# Patient Record
Sex: MALE | Race: WHITE | NOT HISPANIC OR LATINO | Employment: OTHER | URBAN - METROPOLITAN AREA
[De-identification: names, ages, dates, MRNs, and addresses within clinical notes are randomized per-mention and may not be internally consistent; named-entity substitution may affect disease eponyms.]

---

## 2019-07-09 ENCOUNTER — HOSPITAL ENCOUNTER (OUTPATIENT)
Dept: LAB | Facility: MEDICAL CENTER | Age: 49
End: 2019-07-09
Attending: NURSE PRACTITIONER
Payer: MEDICARE

## 2019-07-09 ENCOUNTER — HOSPITAL ENCOUNTER (OUTPATIENT)
Dept: LAB | Facility: MEDICAL CENTER | Age: 49
End: 2019-07-09
Attending: INTERNAL MEDICINE
Payer: MEDICARE

## 2019-07-09 LAB
ALBUMIN SERPL BCP-MCNC: 3.7 G/DL (ref 3.2–4.9)
ALBUMIN SERPL BCP-MCNC: 3.8 G/DL (ref 3.2–4.9)
ALBUMIN/GLOB SERPL: 0.9 G/DL
ALBUMIN/GLOB SERPL: 1 G/DL
ALP SERPL-CCNC: 44 U/L (ref 30–99)
ALP SERPL-CCNC: 45 U/L (ref 30–99)
ALT SERPL-CCNC: 12 U/L (ref 2–50)
ALT SERPL-CCNC: 14 U/L (ref 2–50)
ANION GAP SERPL CALC-SCNC: 6 MMOL/L (ref 0–11.9)
ANION GAP SERPL CALC-SCNC: 6 MMOL/L (ref 0–11.9)
AST SERPL-CCNC: 19 U/L (ref 12–45)
AST SERPL-CCNC: 20 U/L (ref 12–45)
BASOPHILS # BLD AUTO: 1.1 % (ref 0–1.8)
BASOPHILS # BLD AUTO: 1.1 % (ref 0–1.8)
BASOPHILS # BLD: 0.07 K/UL (ref 0–0.12)
BASOPHILS # BLD: 0.07 K/UL (ref 0–0.12)
BILIRUB SERPL-MCNC: 0.7 MG/DL (ref 0.1–1.5)
BILIRUB SERPL-MCNC: 0.8 MG/DL (ref 0.1–1.5)
BUN SERPL-MCNC: 23 MG/DL (ref 8–22)
BUN SERPL-MCNC: 23 MG/DL (ref 8–22)
CALCIUM SERPL-MCNC: 9.8 MG/DL (ref 8.5–10.5)
CALCIUM SERPL-MCNC: 9.8 MG/DL (ref 8.5–10.5)
CHLORIDE SERPL-SCNC: 105 MMOL/L (ref 96–112)
CHLORIDE SERPL-SCNC: 105 MMOL/L (ref 96–112)
CHOLEST SERPL-MCNC: 179 MG/DL (ref 100–199)
CHOLEST SERPL-MCNC: 180 MG/DL (ref 100–199)
CO2 SERPL-SCNC: 28 MMOL/L (ref 20–33)
CO2 SERPL-SCNC: 29 MMOL/L (ref 20–33)
CREAT SERPL-MCNC: 1.14 MG/DL (ref 0.5–1.4)
CREAT SERPL-MCNC: 1.19 MG/DL (ref 0.5–1.4)
EOSINOPHIL # BLD AUTO: 0.04 K/UL (ref 0–0.51)
EOSINOPHIL # BLD AUTO: 0.05 K/UL (ref 0–0.51)
EOSINOPHIL NFR BLD: 0.6 % (ref 0–6.9)
EOSINOPHIL NFR BLD: 0.8 % (ref 0–6.9)
ERYTHROCYTE [DISTWIDTH] IN BLOOD BY AUTOMATED COUNT: 51 FL (ref 35.9–50)
ERYTHROCYTE [DISTWIDTH] IN BLOOD BY AUTOMATED COUNT: 52.4 FL (ref 35.9–50)
GLOBULIN SER CALC-MCNC: 3.9 G/DL (ref 1.9–3.5)
GLOBULIN SER CALC-MCNC: 3.9 G/DL (ref 1.9–3.5)
GLUCOSE SERPL-MCNC: 103 MG/DL (ref 65–99)
GLUCOSE SERPL-MCNC: 104 MG/DL (ref 65–99)
HCT VFR BLD AUTO: 49.3 % (ref 42–52)
HCT VFR BLD AUTO: 49.3 % (ref 42–52)
HDLC SERPL-MCNC: 45 MG/DL
HDLC SERPL-MCNC: 46 MG/DL
HGB BLD-MCNC: 15.9 G/DL (ref 14–18)
HGB BLD-MCNC: 16.2 G/DL (ref 14–18)
IMM GRANULOCYTES # BLD AUTO: 0.04 K/UL (ref 0–0.11)
IMM GRANULOCYTES # BLD AUTO: 0.05 K/UL (ref 0–0.11)
IMM GRANULOCYTES NFR BLD AUTO: 0.6 % (ref 0–0.9)
IMM GRANULOCYTES NFR BLD AUTO: 0.8 % (ref 0–0.9)
LDLC SERPL CALC-MCNC: 114 MG/DL
LDLC SERPL CALC-MCNC: 114 MG/DL
LYMPHOCYTES # BLD AUTO: 1.7 K/UL (ref 1–4.8)
LYMPHOCYTES # BLD AUTO: 1.78 K/UL (ref 1–4.8)
LYMPHOCYTES NFR BLD: 27.5 % (ref 22–41)
LYMPHOCYTES NFR BLD: 27.7 % (ref 22–41)
MCH RBC QN AUTO: 32 PG (ref 27–33)
MCH RBC QN AUTO: 32.5 PG (ref 27–33)
MCHC RBC AUTO-ENTMCNC: 32.3 G/DL (ref 33.7–35.3)
MCHC RBC AUTO-ENTMCNC: 32.9 G/DL (ref 33.7–35.3)
MCV RBC AUTO: 99 FL (ref 81.4–97.8)
MCV RBC AUTO: 99.2 FL (ref 81.4–97.8)
MONOCYTES # BLD AUTO: 0.46 K/UL (ref 0–0.85)
MONOCYTES # BLD AUTO: 0.5 K/UL (ref 0–0.85)
MONOCYTES NFR BLD AUTO: 7.4 % (ref 0–13.4)
MONOCYTES NFR BLD AUTO: 7.8 % (ref 0–13.4)
NEUTROPHILS # BLD AUTO: 3.88 K/UL (ref 1.82–7.42)
NEUTROPHILS # BLD AUTO: 3.98 K/UL (ref 1.82–7.42)
NEUTROPHILS NFR BLD: 61.8 % (ref 44–72)
NEUTROPHILS NFR BLD: 62.8 % (ref 44–72)
NRBC # BLD AUTO: 0 K/UL
NRBC # BLD AUTO: 0 K/UL
NRBC BLD-RTO: 0 /100 WBC
NRBC BLD-RTO: 0 /100 WBC
PLATELET # BLD AUTO: 223 K/UL (ref 164–446)
PLATELET # BLD AUTO: 227 K/UL (ref 164–446)
PMV BLD AUTO: 9.5 FL (ref 9–12.9)
PMV BLD AUTO: 9.7 FL (ref 9–12.9)
POTASSIUM SERPL-SCNC: 4.2 MMOL/L (ref 3.6–5.5)
POTASSIUM SERPL-SCNC: 4.2 MMOL/L (ref 3.6–5.5)
PROT SERPL-MCNC: 7.6 G/DL (ref 6–8.2)
PROT SERPL-MCNC: 7.7 G/DL (ref 6–8.2)
RBC # BLD AUTO: 4.97 M/UL (ref 4.7–6.1)
RBC # BLD AUTO: 4.98 M/UL (ref 4.7–6.1)
SODIUM SERPL-SCNC: 139 MMOL/L (ref 135–145)
SODIUM SERPL-SCNC: 140 MMOL/L (ref 135–145)
T3FREE SERPL-MCNC: 2.73 PG/ML (ref 2.4–4.2)
T4 FREE SERPL-MCNC: 0.77 NG/DL (ref 0.53–1.43)
TRIGL SERPL-MCNC: 101 MG/DL (ref 0–149)
TRIGL SERPL-MCNC: 102 MG/DL (ref 0–149)
TSH SERPL DL<=0.005 MIU/L-ACNC: 0.06 UIU/ML (ref 0.38–5.33)
TSH SERPL DL<=0.005 MIU/L-ACNC: 0.07 UIU/ML (ref 0.38–5.33)
WBC # BLD AUTO: 6.2 K/UL (ref 4.8–10.8)
WBC # BLD AUTO: 6.4 K/UL (ref 4.8–10.8)

## 2019-07-09 PROCEDURE — 80053 COMPREHEN METABOLIC PANEL: CPT

## 2019-07-09 PROCEDURE — 84443 ASSAY THYROID STIM HORMONE: CPT

## 2019-07-09 PROCEDURE — 80061 LIPID PANEL: CPT | Mod: 91

## 2019-07-09 PROCEDURE — 84481 FREE ASSAY (FT-3): CPT

## 2019-07-09 PROCEDURE — 86800 THYROGLOBULIN ANTIBODY: CPT

## 2019-07-09 PROCEDURE — 84439 ASSAY OF FREE THYROXINE: CPT

## 2019-07-09 PROCEDURE — 80061 LIPID PANEL: CPT

## 2019-07-09 PROCEDURE — 85025 COMPLETE CBC W/AUTO DIFF WBC: CPT | Mod: 91

## 2019-07-09 PROCEDURE — 36415 COLL VENOUS BLD VENIPUNCTURE: CPT

## 2019-07-09 PROCEDURE — 84443 ASSAY THYROID STIM HORMONE: CPT | Mod: 91

## 2019-07-09 PROCEDURE — 80053 COMPREHEN METABOLIC PANEL: CPT | Mod: 91

## 2019-07-09 PROCEDURE — 85025 COMPLETE CBC W/AUTO DIFF WBC: CPT

## 2019-07-11 LAB — THYROGLOB AB SERPL-ACNC: 1.7 IU/ML (ref 0–4)

## 2019-08-24 ENCOUNTER — APPOINTMENT (OUTPATIENT)
Dept: RADIOLOGY | Facility: MEDICAL CENTER | Age: 49
DRG: 177 | End: 2019-08-24
Attending: EMERGENCY MEDICINE
Payer: MEDICARE

## 2019-08-24 ENCOUNTER — HOSPITAL ENCOUNTER (INPATIENT)
Facility: MEDICAL CENTER | Age: 49
LOS: 4 days | DRG: 177 | End: 2019-08-29
Attending: EMERGENCY MEDICINE | Admitting: HOSPITALIST
Payer: MEDICARE

## 2019-08-24 DIAGNOSIS — R06.00 DYSPNEA, UNSPECIFIED TYPE: ICD-10-CM

## 2019-08-24 DIAGNOSIS — J69.0 ASPIRATION PNEUMONIA DUE TO REGURGITATED FOOD, UNSPECIFIED LATERALITY, UNSPECIFIED PART OF LUNG (HCC): ICD-10-CM

## 2019-08-24 DIAGNOSIS — R47.02 DYSPHASIA: ICD-10-CM

## 2019-08-24 DIAGNOSIS — Q90.9 DOWN'S SYNDROME: ICD-10-CM

## 2019-08-24 PROCEDURE — 700102 HCHG RX REV CODE 250 W/ 637 OVERRIDE(OP)

## 2019-08-24 PROCEDURE — 83880 ASSAY OF NATRIURETIC PEPTIDE: CPT

## 2019-08-24 PROCEDURE — 85025 COMPLETE CBC W/AUTO DIFF WBC: CPT

## 2019-08-24 PROCEDURE — 80053 COMPREHEN METABOLIC PANEL: CPT

## 2019-08-24 PROCEDURE — 84484 ASSAY OF TROPONIN QUANT: CPT

## 2019-08-24 PROCEDURE — 93005 ELECTROCARDIOGRAM TRACING: CPT | Performed by: EMERGENCY MEDICINE

## 2019-08-24 PROCEDURE — 304561 HCHG STAT O2

## 2019-08-24 PROCEDURE — 99285 EMERGENCY DEPT VISIT HI MDM: CPT

## 2019-08-24 PROCEDURE — A9270 NON-COVERED ITEM OR SERVICE: HCPCS

## 2019-08-24 PROCEDURE — 93005 ELECTROCARDIOGRAM TRACING: CPT

## 2019-08-24 RX ADMIN — LIDOCAINE HYDROCHLORIDE 30 ML: 20 SOLUTION OROPHARYNGEAL at 23:08

## 2019-08-24 RX ADMIN — Medication 30 ML: at 23:08

## 2019-08-24 SDOH — HEALTH STABILITY: MENTAL HEALTH: HOW OFTEN DO YOU HAVE A DRINK CONTAINING ALCOHOL?: NEVER

## 2019-08-25 ENCOUNTER — APPOINTMENT (OUTPATIENT)
Dept: RADIOLOGY | Facility: MEDICAL CENTER | Age: 49
DRG: 177 | End: 2019-08-25
Attending: INTERNAL MEDICINE
Payer: MEDICARE

## 2019-08-25 PROBLEM — Q90.9 DOWN'S SYNDROME: Status: ACTIVE | Noted: 2019-08-25

## 2019-08-25 PROBLEM — E66.3 OVERWEIGHT (BMI 25.0-29.9): Status: ACTIVE | Noted: 2019-08-25

## 2019-08-25 PROBLEM — J96.20 ACUTE ON CHRONIC RESPIRATORY FAILURE (HCC): Status: ACTIVE | Noted: 2019-08-25

## 2019-08-25 PROBLEM — J69.0 ASPIRATION PNEUMONIA (HCC): Status: ACTIVE | Noted: 2019-08-25

## 2019-08-25 LAB
ALBUMIN SERPL BCP-MCNC: 4 G/DL (ref 3.2–4.9)
ALBUMIN/GLOB SERPL: 1.1 G/DL
ALP SERPL-CCNC: 51 U/L (ref 30–99)
ALT SERPL-CCNC: 23 U/L (ref 2–50)
ANION GAP SERPL CALC-SCNC: 10 MMOL/L (ref 0–11.9)
AST SERPL-CCNC: 24 U/L (ref 12–45)
BASOPHILS # BLD AUTO: 0.7 % (ref 0–1.8)
BASOPHILS # BLD: 0.08 K/UL (ref 0–0.12)
BILIRUB SERPL-MCNC: 0.4 MG/DL (ref 0.1–1.5)
BUN SERPL-MCNC: 26 MG/DL (ref 8–22)
CALCIUM SERPL-MCNC: 9.3 MG/DL (ref 8.5–10.5)
CHLORIDE SERPL-SCNC: 103 MMOL/L (ref 96–112)
CO2 SERPL-SCNC: 25 MMOL/L (ref 20–33)
CREAT SERPL-MCNC: 1.3 MG/DL (ref 0.5–1.4)
EKG IMPRESSION: NORMAL
EOSINOPHIL # BLD AUTO: 0.02 K/UL (ref 0–0.51)
EOSINOPHIL NFR BLD: 0.2 % (ref 0–6.9)
ERYTHROCYTE [DISTWIDTH] IN BLOOD BY AUTOMATED COUNT: 50.5 FL (ref 35.9–50)
GLOBULIN SER CALC-MCNC: 3.8 G/DL (ref 1.9–3.5)
GLUCOSE SERPL-MCNC: 162 MG/DL (ref 65–99)
HCT VFR BLD AUTO: 52.3 % (ref 42–52)
HGB BLD-MCNC: 17.6 G/DL (ref 14–18)
IMM GRANULOCYTES # BLD AUTO: 0.08 K/UL (ref 0–0.11)
IMM GRANULOCYTES NFR BLD AUTO: 0.7 % (ref 0–0.9)
LYMPHOCYTES # BLD AUTO: 1.18 K/UL (ref 1–4.8)
LYMPHOCYTES NFR BLD: 10.6 % (ref 22–41)
MCH RBC QN AUTO: 33.5 PG (ref 27–33)
MCHC RBC AUTO-ENTMCNC: 33.7 G/DL (ref 33.7–35.3)
MCV RBC AUTO: 99.6 FL (ref 81.4–97.8)
MONOCYTES # BLD AUTO: 0.46 K/UL (ref 0–0.85)
MONOCYTES NFR BLD AUTO: 4.1 % (ref 0–13.4)
NEUTROPHILS # BLD AUTO: 9.35 K/UL (ref 1.82–7.42)
NEUTROPHILS NFR BLD: 83.7 % (ref 44–72)
NRBC # BLD AUTO: 0 K/UL
NRBC BLD-RTO: 0 /100 WBC
NT-PROBNP SERPL IA-MCNC: 220 PG/ML (ref 0–125)
PLATELET # BLD AUTO: 199 K/UL (ref 164–446)
PMV BLD AUTO: 9.3 FL (ref 9–12.9)
POTASSIUM SERPL-SCNC: 4.3 MMOL/L (ref 3.6–5.5)
PROT SERPL-MCNC: 7.8 G/DL (ref 6–8.2)
RBC # BLD AUTO: 5.25 M/UL (ref 4.7–6.1)
SODIUM SERPL-SCNC: 138 MMOL/L (ref 135–145)
TROPONIN T SERPL-MCNC: 21 NG/L (ref 6–19)
WBC # BLD AUTO: 11.2 K/UL (ref 4.8–10.8)

## 2019-08-25 PROCEDURE — 96365 THER/PROPH/DIAG IV INF INIT: CPT

## 2019-08-25 PROCEDURE — 74018 RADEX ABDOMEN 1 VIEW: CPT

## 2019-08-25 PROCEDURE — 99223 1ST HOSP IP/OBS HIGH 75: CPT | Mod: 25 | Performed by: HOSPITALIST

## 2019-08-25 PROCEDURE — 700111 HCHG RX REV CODE 636 W/ 250 OVERRIDE (IP): Performed by: HOSPITALIST

## 2019-08-25 PROCEDURE — 36415 COLL VENOUS BLD VENIPUNCTURE: CPT

## 2019-08-25 PROCEDURE — 302136 NUTRITION PUMP: Performed by: INTERNAL MEDICINE

## 2019-08-25 PROCEDURE — 92610 EVALUATE SWALLOWING FUNCTION: CPT

## 2019-08-25 PROCEDURE — 700111 HCHG RX REV CODE 636 W/ 250 OVERRIDE (IP): Performed by: EMERGENCY MEDICINE

## 2019-08-25 PROCEDURE — 770020 HCHG ROOM/CARE - TELE (206)

## 2019-08-25 PROCEDURE — 99497 ADVNCD CARE PLAN 30 MIN: CPT | Performed by: INTERNAL MEDICINE

## 2019-08-25 PROCEDURE — 71045 X-RAY EXAM CHEST 1 VIEW: CPT

## 2019-08-25 PROCEDURE — 3E0G76Z INTRODUCTION OF NUTRITIONAL SUBSTANCE INTO UPPER GI, VIA NATURAL OR ARTIFICIAL OPENING: ICD-10-PCS | Performed by: INTERNAL MEDICINE

## 2019-08-25 PROCEDURE — 700105 HCHG RX REV CODE 258: Performed by: HOSPITALIST

## 2019-08-25 PROCEDURE — 700105 HCHG RX REV CODE 258: Performed by: EMERGENCY MEDICINE

## 2019-08-25 PROCEDURE — 87040 BLOOD CULTURE FOR BACTERIA: CPT

## 2019-08-25 RX ORDER — AMOXICILLIN 250 MG
2 CAPSULE ORAL 2 TIMES DAILY
Status: DISCONTINUED | OUTPATIENT
Start: 2019-08-25 | End: 2019-08-29 | Stop reason: HOSPADM

## 2019-08-25 RX ORDER — LEVOTHYROXINE SODIUM 0.15 MG/1
150 TABLET ORAL
COMMUNITY

## 2019-08-25 RX ORDER — ONDANSETRON 4 MG/1
4 TABLET, ORALLY DISINTEGRATING ORAL EVERY 4 HOURS PRN
Status: DISCONTINUED | OUTPATIENT
Start: 2019-08-25 | End: 2019-08-29 | Stop reason: HOSPADM

## 2019-08-25 RX ORDER — ACETAMINOPHEN 325 MG/1
650 TABLET ORAL EVERY 6 HOURS PRN
Status: DISCONTINUED | OUTPATIENT
Start: 2019-08-25 | End: 2019-08-29 | Stop reason: HOSPADM

## 2019-08-25 RX ORDER — PROMETHAZINE HYDROCHLORIDE 25 MG/1
12.5-25 SUPPOSITORY RECTAL EVERY 4 HOURS PRN
Status: DISCONTINUED | OUTPATIENT
Start: 2019-08-25 | End: 2019-08-29 | Stop reason: HOSPADM

## 2019-08-25 RX ORDER — AMOXICILLIN 250 MG
2 CAPSULE ORAL 2 TIMES DAILY
Status: DISCONTINUED | OUTPATIENT
Start: 2019-08-25 | End: 2019-08-25

## 2019-08-25 RX ORDER — ACETAMINOPHEN 325 MG/1
650 TABLET ORAL EVERY 6 HOURS PRN
Status: DISCONTINUED | OUTPATIENT
Start: 2019-08-25 | End: 2019-08-25

## 2019-08-25 RX ORDER — SODIUM CHLORIDE, SODIUM LACTATE, POTASSIUM CHLORIDE, CALCIUM CHLORIDE 600; 310; 30; 20 MG/100ML; MG/100ML; MG/100ML; MG/100ML
INJECTION, SOLUTION INTRAVENOUS CONTINUOUS
Status: DISCONTINUED | OUTPATIENT
Start: 2019-08-25 | End: 2019-08-28

## 2019-08-25 RX ORDER — POLYETHYLENE GLYCOL 3350 17 G/17G
1 POWDER, FOR SOLUTION ORAL
Status: DISCONTINUED | OUTPATIENT
Start: 2019-08-25 | End: 2019-08-29 | Stop reason: HOSPADM

## 2019-08-25 RX ORDER — ONDANSETRON 4 MG/1
4 TABLET, ORALLY DISINTEGRATING ORAL EVERY 4 HOURS PRN
Status: DISCONTINUED | OUTPATIENT
Start: 2019-08-25 | End: 2019-08-25

## 2019-08-25 RX ORDER — AZITHROMYCIN 500 MG/1
500 INJECTION, POWDER, LYOPHILIZED, FOR SOLUTION INTRAVENOUS ONCE
Status: COMPLETED | OUTPATIENT
Start: 2019-08-25 | End: 2019-08-25

## 2019-08-25 RX ORDER — ONDANSETRON 2 MG/ML
4 INJECTION INTRAMUSCULAR; INTRAVENOUS EVERY 4 HOURS PRN
Status: DISCONTINUED | OUTPATIENT
Start: 2019-08-25 | End: 2019-08-29 | Stop reason: HOSPADM

## 2019-08-25 RX ORDER — POLYETHYLENE GLYCOL 3350 17 G/17G
1 POWDER, FOR SOLUTION ORAL
Status: DISCONTINUED | OUTPATIENT
Start: 2019-08-25 | End: 2019-08-25

## 2019-08-25 RX ORDER — CLONIDINE HYDROCHLORIDE 0.1 MG/1
0.1 TABLET ORAL EVERY 6 HOURS PRN
Status: DISCONTINUED | OUTPATIENT
Start: 2019-08-25 | End: 2019-08-29 | Stop reason: HOSPADM

## 2019-08-25 RX ORDER — PROCHLORPERAZINE EDISYLATE 5 MG/ML
5-10 INJECTION INTRAMUSCULAR; INTRAVENOUS EVERY 4 HOURS PRN
Status: DISCONTINUED | OUTPATIENT
Start: 2019-08-25 | End: 2019-08-29 | Stop reason: HOSPADM

## 2019-08-25 RX ORDER — SERTRALINE HYDROCHLORIDE 100 MG/1
100 TABLET, FILM COATED ORAL EVERY MORNING
COMMUNITY

## 2019-08-25 RX ORDER — PROMETHAZINE HYDROCHLORIDE 25 MG/1
12.5-25 TABLET ORAL EVERY 4 HOURS PRN
Status: DISCONTINUED | OUTPATIENT
Start: 2019-08-25 | End: 2019-08-25

## 2019-08-25 RX ORDER — PROMETHAZINE HYDROCHLORIDE 25 MG/1
12.5-25 TABLET ORAL EVERY 4 HOURS PRN
Status: DISCONTINUED | OUTPATIENT
Start: 2019-08-25 | End: 2019-08-29 | Stop reason: HOSPADM

## 2019-08-25 RX ORDER — CLONIDINE HYDROCHLORIDE 0.1 MG/1
0.1 TABLET ORAL EVERY 6 HOURS PRN
Status: DISCONTINUED | OUTPATIENT
Start: 2019-08-25 | End: 2019-08-25

## 2019-08-25 RX ORDER — BISACODYL 10 MG
10 SUPPOSITORY, RECTAL RECTAL
Status: DISCONTINUED | OUTPATIENT
Start: 2019-08-25 | End: 2019-08-29 | Stop reason: HOSPADM

## 2019-08-25 RX ORDER — BISACODYL 10 MG
10 SUPPOSITORY, RECTAL RECTAL
Status: DISCONTINUED | OUTPATIENT
Start: 2019-08-25 | End: 2019-08-25

## 2019-08-25 RX ADMIN — ENOXAPARIN SODIUM 40 MG: 100 INJECTION SUBCUTANEOUS at 08:58

## 2019-08-25 RX ADMIN — SODIUM CHLORIDE, POTASSIUM CHLORIDE, SODIUM LACTATE AND CALCIUM CHLORIDE 1000 ML: 600; 310; 30; 20 INJECTION, SOLUTION INTRAVENOUS at 05:49

## 2019-08-25 RX ADMIN — AZITHROMYCIN MONOHYDRATE 500 MG: 500 INJECTION, POWDER, LYOPHILIZED, FOR SOLUTION INTRAVENOUS at 04:18

## 2019-08-25 RX ADMIN — AMPICILLIN AND SULBACTAM 3 G: 2; 1 INJECTION, POWDER, FOR SOLUTION INTRAVENOUS at 16:12

## 2019-08-25 RX ADMIN — CEFTRIAXONE SODIUM 1 G: 1 INJECTION, POWDER, FOR SOLUTION INTRAMUSCULAR; INTRAVENOUS at 03:05

## 2019-08-25 RX ADMIN — AMPICILLIN AND SULBACTAM 3 G: 2; 1 INJECTION, POWDER, FOR SOLUTION INTRAVENOUS at 08:59

## 2019-08-25 RX ADMIN — AMPICILLIN AND SULBACTAM 3 G: 2; 1 INJECTION, POWDER, FOR SOLUTION INTRAVENOUS at 21:00

## 2019-08-25 ASSESSMENT — LIFESTYLE VARIABLES
AVERAGE NUMBER OF DAYS PER WEEK YOU HAVE A DRINK CONTAINING ALCOHOL: 0
HOW MANY TIMES IN THE PAST YEAR HAVE YOU HAD 5 OR MORE DRINKS IN A DAY: 0
ON A TYPICAL DAY WHEN YOU DRINK ALCOHOL HOW MANY DRINKS DO YOU HAVE: 0
TOTAL SCORE: 0
TOTAL SCORE: 0
DOES PATIENT WANT TO STOP DRINKING: NO
CONSUMPTION TOTAL: NEGATIVE
EVER_SMOKED: NEVER
HAVE YOU EVER FELT YOU SHOULD CUT DOWN ON YOUR DRINKING: NO
TOTAL SCORE: 0
HAVE PEOPLE ANNOYED YOU BY CRITICIZING YOUR DRINKING: NO
ALCOHOL_USE: NO
EVER FELT BAD OR GUILTY ABOUT YOUR DRINKING: NO
EVER HAD A DRINK FIRST THING IN THE MORNING TO STEADY YOUR NERVES TO GET RID OF A HANGOVER: NO

## 2019-08-25 ASSESSMENT — COGNITIVE AND FUNCTIONAL STATUS - GENERAL
TOILETING: A LITTLE
TURNING FROM BACK TO SIDE WHILE IN FLAT BAD: A LITTLE
MOBILITY SCORE: 17
STANDING UP FROM CHAIR USING ARMS: A LITTLE
DRESSING REGULAR LOWER BODY CLOTHING: A LOT
DRESSING REGULAR UPPER BODY CLOTHING: A LOT
EATING MEALS: A LOT
MOVING FROM LYING ON BACK TO SITTING ON SIDE OF FLAT BED: A LITTLE
CLIMB 3 TO 5 STEPS WITH RAILING: A LOT
MOVING TO AND FROM BED TO CHAIR: A LITTLE
SUGGESTED CMS G CODE MODIFIER MOBILITY: CK
SUGGESTED CMS G CODE MODIFIER DAILY ACTIVITY: CL
PERSONAL GROOMING: A LOT
WALKING IN HOSPITAL ROOM: A LITTLE
HELP NEEDED FOR BATHING: A LOT
DAILY ACTIVITIY SCORE: 13

## 2019-08-25 ASSESSMENT — PATIENT HEALTH QUESTIONNAIRE - PHQ9
SUM OF ALL RESPONSES TO PHQ9 QUESTIONS 1 AND 2: 0
1. LITTLE INTEREST OR PLEASURE IN DOING THINGS: NOT AT ALL
1. LITTLE INTEREST OR PLEASURE IN DOING THINGS: NOT AT ALL
SUM OF ALL RESPONSES TO PHQ9 QUESTIONS 1 AND 2: 0
2. FEELING DOWN, DEPRESSED, IRRITABLE, OR HOPELESS: NOT AT ALL
2. FEELING DOWN, DEPRESSED, IRRITABLE, OR HOPELESS: NOT AT ALL

## 2019-08-25 ASSESSMENT — COPD QUESTIONNAIRES
DURING THE PAST 4 WEEKS HOW MUCH DID YOU FEEL SHORT OF BREATH: SOME OF THE TIME
DO YOU EVER COUGH UP ANY MUCUS OR PHLEGM?: NO/ONLY WITH OCCASIONAL COLDS OR INFECTIONS
HAVE YOU SMOKED AT LEAST 100 CIGARETTES IN YOUR ENTIRE LIFE: NO/DON'T KNOW
COPD SCREENING SCORE: 3
IN THE PAST 12 MONTHS DO YOU DO LESS THAN YOU USED TO BECAUSE OF YOUR BREATHING PROBLEMS: AGREE

## 2019-08-25 NOTE — H&P
Hospital Medicine History & Physical Note    Date of Service  8/25/2019    Primary Care Physician  Arian Haney M.D.    Consultants  None    Code Status  Full code     Chief Complaint  Shortness of breath, cough    History of Presenting Illness  49 y.o. Male with Down syndrome, with nonverbal state, but with no other significant medical problems was in his usual state of health until the evening prior to admission.  He apparently was attempting to eat, when he swallowed something incorrectly, and was unable to further swallow fluids or other food without coughing fits.  This did improve somewhat, however he was then unable to swallow his bedtime snack of pudding.  Additionally, he was noted to be somewhat febrile, so he was brought to the emergency department for further evaluation.  He cannot provide his own history given the Down syndrome and averbal state.    Review of Systems  Review of Systems   Unable to perform ROS: Patient nonverbal       Past Medical History   has no past medical history on file.    Surgical History   has no past surgical history on file.     Family History  family history includes Lung Disease in his mother.     Social History   reports that he has never smoked. He has never used smokeless tobacco. He reports that he does not drink alcohol or use drugs.    Allergies  No Known Allergies    Medications  None       Physical Exam  Temp:  [36.4 °C (97.5 °F)-37.1 °C (98.8 °F)] 37.1 °C (98.8 °F)  Pulse:  [] 104  Resp:  [17-20] 17  BP: (103-125)/(76-86) 123/77  SpO2:  [90 %-94 %] 94 %    Physical Exam   Constitutional: He appears well-developed and well-nourished. No distress.   HENT:   Head: Normocephalic and atraumatic.   Eyes: Pupils are equal, round, and reactive to light. Conjunctivae are normal.   Neck: Normal range of motion. Neck supple. No tracheal deviation present. No thyromegaly present.   Cardiovascular: Normal rate, regular rhythm and normal heart sounds. Exam reveals no  gallop and no friction rub.   No murmur heard.  Pulmonary/Chest: He is in respiratory distress. He has no wheezes. He has rales.   Abdominal: Soft. Bowel sounds are normal. He exhibits no distension and no mass. There is no tenderness. There is no rebound and no guarding.   Musculoskeletal: Normal range of motion. He exhibits no edema.   Lymphadenopathy:     He has no cervical adenopathy.   Neurological: He is alert. No cranial nerve deficit.   Skin: Skin is warm and dry. He is not diaphoretic.   Psychiatric: He has a normal mood and affect.   Nursing note and vitals reviewed.      Laboratory:  Recent Labs     08/24/19  2350   WBC 11.2*   RBC 5.25   HEMOGLOBIN 17.6   HEMATOCRIT 52.3*   MCV 99.6*   MCH 33.5*   MCHC 33.7   RDW 50.5*   PLATELETCT 199   MPV 9.3     Recent Labs     08/24/19  2350   SODIUM 138   POTASSIUM 4.3   CHLORIDE 103   CO2 25   GLUCOSE 162*   BUN 26*   CREATININE 1.30   CALCIUM 9.3     Recent Labs     08/24/19  2350   ALTSGPT 23   ASTSGOT 24   ALKPHOSPHAT 51   TBILIRUBIN 0.4   GLUCOSE 162*         Recent Labs     08/24/19  2350   NTPROBNP 220*         Recent Labs     08/24/19  2350   TROPONINT 21*       Urinalysis:    No results found     Imaging:  DX-CHEST-PORTABLE (1 VIEW)   Final Result      Bilateral opacities as noted above, consistent with pulmonary edema.            Assessment/Plan:  I anticipate this patient will require at least two midnights for appropriate medical management, necessitating inpatient admission.    * Aspiration pneumonia (HCC)  Assessment & Plan  Concern for the same, with aspiration event on evening prior to admission.  Plan for IV antibiotic therapy, monitor cultures, and Speech/Swallow evaluation.      Overweight (BMI 25.0-29.9)  Assessment & Plan  Body mass index is 25.87 kg/m².      Down's syndrome  Assessment & Plan  Non-verbal at baseline.  Will benefit from sister being at bedside if this can be accommodated.      Acute on chronic respiratory failure  (HCC)  Assessment & Plan  Typically on 3-4L oxygen at home at baseline.  Now requiring face mask.  Titrate down as tolerated.          VTE prophylaxis: SCD, lovenox

## 2019-08-25 NOTE — ED PROVIDER NOTES
ED Provider Note    CHIEF COMPLAINT  Chief Complaint   Patient presents with   • Difficulty Breathing   • Difficulty Swallowing       HPI  Arian Ramsey is a 49 y.o. male who presents with difficulty with swallowing.  Family states the patient was eating dinner this evening he started having some difficulty with swallowing and was throwing up.  Subsequently the patient's ox requirements did increase.  The patient is nonverbal due to mental retardation.  The patient has not had any recent fevers.  No further history could be obtained to the patient's nonverbal state.    REVIEW OF SYSTEMS  See HPI for further details.  Unobtainable    PAST MEDICAL HISTORY  No past medical history on file.    FAMILY HISTORY  [unfilled]    SOCIAL HISTORY  Social History     Socioeconomic History   • Marital status: Single     Spouse name: Not on file   • Number of children: Not on file   • Years of education: Not on file   • Highest education level: Not on file   Occupational History   • Not on file   Social Needs   • Financial resource strain: Not on file   • Food insecurity:     Worry: Not on file     Inability: Not on file   • Transportation needs:     Medical: Not on file     Non-medical: Not on file   Tobacco Use   • Smoking status: Never Smoker   • Smokeless tobacco: Never Used   Substance and Sexual Activity   • Alcohol use: Never     Frequency: Never   • Drug use: Never   • Sexual activity: Not on file   Lifestyle   • Physical activity:     Days per week: Not on file     Minutes per session: Not on file   • Stress: Not on file   Relationships   • Social connections:     Talks on phone: Not on file     Gets together: Not on file     Attends L[employer] service: Not on file     Active member of club or organization: Not on file     Attends meetings of clubs or organizations: Not on file     Relationship status: Not on file   • Intimate partner violence:     Fear of current or ex partner: Not on file     Emotionally abused:  "Not on file     Physically abused: Not on file     Forced sexual activity: Not on file   Other Topics Concern   • Not on file   Social History Narrative   • Not on file       SURGICAL HISTORY  No past surgical history on file.    CURRENT MEDICATIONS  Home Medications     Reviewed by Roma Puentes R.N. (Registered Nurse) on 08/24/19 at 2255  Med List Status: Partial   Medication Last Dose Status        Patient Narinder Taking any Medications                       ALLERGIES  No Known Allergies    PHYSICAL EXAM  VITAL SIGNS: /76   Pulse (!) 104   Temp 36.4 °C (97.5 °F) (Temporal)   Resp 20   Ht 1.499 m (4' 11\")   Wt 58.1 kg (128 lb 1.4 oz)   SpO2 91%   BMI 25.87 kg/m²       Constitutional: No obvious distress  HENT: Normocephalic, Atraumatic, Bilateral external ears normal, Oropharynx moist, No oral exudates, Nose normal.   Eyes: PERRLA, EOMI, Conjunctiva normal, No discharge.   Neck: Normal range of motion, No tenderness, Supple, No stridor.   Lymphatic: No lymphadenopathy noted.   Cardiovascular: Tachycardic heart rate, Normal rhythm, No murmurs, No rubs, No gallops.   Thorax & Lungs: Symmetrically diminished throughout, diffuse rhonchi, no rales  Abdomen: Bowel sounds normal, Soft, No tenderness, No masses, No pulsatile masses.   Skin: Warm, Dry, No erythema, No rash.   Back: No tenderness, No CVA tenderness.   Extremities: Intact distal pulses, No edema, No tenderness, No cyanosis, No clubbing.   Neurologic: At his baseline according to family  Psychiatric: Affect normal, Judgment normal, Mood normal.       RADIOLOGY/PROCEDURES  DX-CHEST-PORTABLE (1 VIEW)   Final Result      Bilateral opacities as noted above, consistent with pulmonary edema.        Differential diagnosis includes aspiration, pneumonia, heart failure, or viral pneumonitis    COURSE & MEDICAL DECISION MAKING  Pertinent Labs & Imaging studies reviewed. (See chart for details)  This a 49-year-old male who presents the emerge " department after a possible aspiration.  On arrival administered a GI cocktail initially tolerated GI cocktail however shortly thereafter he did start having a lot of coughing.  Therefore chest x-ray was ordered.  It does show some possible bilateral opacities consistent with pulmonary edema but clinically does not appear to be in heart failure.  I am concerned the patient may have had some aspiration he has had some continued coughing spells.  At the time of admission I did attempt an oral challenge with water and the patient did tolerate it with no acute coughing nor vomiting.  Is ox requirements have increased from his baseline.  The patient will therefore be admitted for possible aspiration pneumonitis.  The patient will receive Rocephin and azithromycin.  I have ordered a BNP to make sure he does not have any heart failure.  The patient be admitted to the hospitalist in stable condition.    FINAL IMPRESSION  1.  Dyspnea    Disposition  The patient will be admitted in stable condition         Electronically signed by: Thom Raymond, 8/24/2019 11:11 PM    Results for orders placed or performed during the hospital encounter of 08/24/19   CBC WITH DIFFERENTIAL   Result Value Ref Range    WBC 11.2 (H) 4.8 - 10.8 K/uL    RBC 5.25 4.70 - 6.10 M/uL    Hemoglobin 17.6 14.0 - 18.0 g/dL    Hematocrit 52.3 (H) 42.0 - 52.0 %    MCV 99.6 (H) 81.4 - 97.8 fL    MCH 33.5 (H) 27.0 - 33.0 pg    MCHC 33.7 33.7 - 35.3 g/dL    RDW 50.5 (H) 35.9 - 50.0 fL    Platelet Count 199 164 - 446 K/uL    MPV 9.3 9.0 - 12.9 fL    Neutrophils-Polys 83.70 (H) 44.00 - 72.00 %    Lymphocytes 10.60 (L) 22.00 - 41.00 %    Monocytes 4.10 0.00 - 13.40 %    Eosinophils 0.20 0.00 - 6.90 %    Basophils 0.70 0.00 - 1.80 %    Immature Granulocytes 0.70 0.00 - 0.90 %    Nucleated RBC 0.00 /100 WBC    Neutrophils (Absolute) 9.35 (H) 1.82 - 7.42 K/uL    Lymphs (Absolute) 1.18 1.00 - 4.80 K/uL    Monos (Absolute) 0.46 0.00 - 0.85 K/uL    Eos (Absolute) 0.02  0.00 - 0.51 K/uL    Baso (Absolute) 0.08 0.00 - 0.12 K/uL    Immature Granulocytes (abs) 0.08 0.00 - 0.11 K/uL    NRBC (Absolute) 0.00 K/uL   COMP METABOLIC PANEL   Result Value Ref Range    Sodium 138 135 - 145 mmol/L    Potassium 4.3 3.6 - 5.5 mmol/L    Chloride 103 96 - 112 mmol/L    Co2 25 20 - 33 mmol/L    Anion Gap 10.0 0.0 - 11.9    Glucose 162 (H) 65 - 99 mg/dL    Bun 26 (H) 8 - 22 mg/dL    Creatinine 1.30 0.50 - 1.40 mg/dL    Calcium 9.3 8.5 - 10.5 mg/dL    AST(SGOT) 24 12 - 45 U/L    ALT(SGPT) 23 2 - 50 U/L    Alkaline Phosphatase 51 30 - 99 U/L    Total Bilirubin 0.4 0.1 - 1.5 mg/dL    Albumin 4.0 3.2 - 4.9 g/dL    Total Protein 7.8 6.0 - 8.2 g/dL    Globulin 3.8 (H) 1.9 - 3.5 g/dL    A-G Ratio 1.1 g/dL   ESTIMATED GFR   Result Value Ref Range    GFR If African American >60 >60 mL/min/1.73 m 2    GFR If Non African American 58 (A) >60 mL/min/1.73 m 2   EKG   Result Value Ref Range    Report       [facility] Emergency Dept.    Test Date:  2019  Pt Name:    ILA RICH            Department: ER  MRN:        [id number]                      Room:  Gender:     Male                         Technician: [id number]  :        1970                   Requested By:ER TRIAGE PROTOCOL  Order #:    [id number]                    Reading MD: KUMAR LINN MD    Measurements  Intervals                                Axis  Rate:       111                          P:          69  NC:         184                          QRS:        266  QRSD:       108                          T:          68  QT:         344  QTc:        468    Interpretive Statements  Twelve-lead EKG shows a sinus tachycardia with a ventricular rate of 111, no  ST  segment elevation or depression, normal QRS complex, T wave inverted in V1  and  laterally in aVL  Electronically Signed On 2019 1:23:44 PDT by KUMAR LINN MD

## 2019-08-25 NOTE — ASSESSMENT & PLAN NOTE
8/26 aspiration event on evening prior to admission.    -Blood cultures pending  Continues Unasyn  Oxygen supplementation as needed  Ongoing leukocytosis  Positive procalcitonin of 2.3  8/27 blood culture from 8/25 negative for 48 hours  Respiratory status stable on 6 L oxygen mask  White blood cells went down to 9.7  8/28.  Repeat chest x-ray unchanged, on 6 L.  Need to wean down oxygen to 4 L based

## 2019-08-25 NOTE — ED NOTES
Pt now coughing and gagging but not actually vomiting at this time. Will notify provider and will continue to monitor.

## 2019-08-25 NOTE — PROGRESS NOTES
Shift report done at bedside with night RN. Pt resting comfortably with no signs of distress. Family at bedside. Bed alarm active, bed in lowest position, and instructions on call light given. Will continue to monitor patient and update plan of care.

## 2019-08-25 NOTE — ED TRIAGE NOTES
"Arian Trinidad [place]  49 y.o. male  Chief Complaint   Patient presents with   • Difficulty Breathing   • Difficulty Swallowing     Pt ambulated to triage with steady gait for above complaint.     Pt is non-verbal at baseline. Brother and sister report pt has been unable to swallow anything this evening, and continues to cough and make \"gagging noises.\"   Pt wears 4L O2 at home, currently on 6L sating 93%.     Charge RN notified of patient.     EKG ordered.     Pt back to lobby. Educated to inform staff of any concerns or changes.     Blood Pressure: 103/81, Pulse: (!) 130, Respiration: 18, Temperature: 36.4 °C (97.5 °F), Height: 149.9 cm (4' 11\"), Weight: 58.1 kg (128 lb 1.4 oz), Pulse Oximetry: 91 %, O2 (LPM): 5    "

## 2019-08-25 NOTE — ED NOTES
Pt given GI cocktail and tolerated well, ERP witnessed. ERP gave verbal orders to hold IV and blood tests at this time. Will continue to monitor.

## 2019-08-25 NOTE — ED NOTES
Pt resting in bed with even and unlabored breaths. No distress noted. Family at bedside. Will continue to monitor.

## 2019-08-25 NOTE — ED NOTES
Pt resting in bed in no apparent distress with even and unlabored breaths. Pt and sister at bedside updated regarding plan of care including status waiting for bed assignment in hospital, sister demonstrated understanding. Also called Tele 7 to let RN know that pt is ready for pickup.

## 2019-08-25 NOTE — ED NOTES
Pt resting in bed with even and unlabored breaths. No apparent distress noted and family is still at bedside. Pt appears to be breathing through his mouth so he was placed on an O2 face mask and is tolerating it well. Will continue to monitor pt while awaiting further orders.

## 2019-08-25 NOTE — ASSESSMENT & PLAN NOTE
Typically on 3-4L oxygen at home at baseline.  Now requiring face mask.    Titrate down as tolerated.  Otherwise do home O2 evaluation for new oxygen requirement

## 2019-08-25 NOTE — PROGRESS NOTES
49-year-old male with Down syndrome presents status post cough while eating.  He has been admitted for aspiration pneumonia with concerns for dysphasia.  He has been started on IV antibiotics.  To be seen by speech therapy.    He appears comfortable on oxygen supplementation.  We will continue current management.    Addendum: pt requiring 10L oxygen supplementation, improved sob  Appears comfortable, taper as tolerated.    Pt non-verbal, following some commands.  D/w father and sister at bedside.  Discussed POC and code status, wishes to remain full code, refusing cortrak placement, palliative care consult.    Seen by SLP, to reeducate, at this point family wishes to have reevaluation and would consider feeding despite aspiration risk.    Appreciate palliative care assistance.  RN at bedside.    .I discussed advance care planning with the patient's family for at least 26 minutes, including diagnosis, prognosis, plan of care, risks and benefits of any therapies that could be offered, as well as alternatives including palliation and hospice, as appropriate. Patient to remain full code. BILL CODE 01168.

## 2019-08-25 NOTE — THERAPY
"  Speech Language Therapy Clinical Swallow Evaluation completed.  Functional Status: Pt was seen for a clinical bedside swallow evaluation this date. Patient on 6L 02 via mask,admit for aspiration PNA. Patient alert and able to follow simple directives. Patient consumed PO trials of ice, thins, nectars via tsp. Patient demonstrated oral bolus holding, tongue pumping, tongue thrusting noted with decreased and delayed laryngeal elevation and hyolaryngeal excursion noted upon palpation. Immediate wet/gurgly sounds and notable increased WOB noted after all PO trials. Delayed throat clearing s/p trials of thins. Patient is at high risk for ongoing aspiration and increased 02 requirements at this time. Patients father at bedside and education was provided and discussed POC. At this time recommend NPO and consider alternative source of nutrition/hydration via Cortrak, pre feeding trials with SLP. Consider an MBS prior to full PO advancement to further assess oropharyngeal swallow function and r/o silent aspiration. Patient's father agreeable to POC.  Recommendations - Diet: Diet / Liquid Recommendation: NPO, Pre-Feeding Trials with SLP Only                          Strategies: to be assessed                           Medication Administration: Medication Administration : Via Gastric Tube  Plan of Care: Will benefit from Speech Therapy 5 times per week  Post-Acute Therapy: Discharge to a transitional care facility for continued skilled therapy services.    See \"Rehab Therapy-Acute\" Patient Summary Report for complete documentation.   "

## 2019-08-25 NOTE — ED NOTES
Pt has Down Syndrome and is nonverbal, his brother and sister (who take care of him) are at bedside speaking to ERP.

## 2019-08-25 NOTE — ASSESSMENT & PLAN NOTE
Non-verbal at baseline.  Will benefit from sister being at bedside if this can be accommodated.      8/26 pulling at cortrak, restraints prn    No history of dysphagia prior  Will need continued speech therapy

## 2019-08-25 NOTE — ED NOTES
Pt resting in bed with family at bedside. No distress noted at this time and breaths are even and and unlabored. Will continue to monitor.

## 2019-08-25 NOTE — ED NOTES
Bedside report given to KATTY Tineo. Pt awake and alert, in no apparent distress at time of transfer. Pt being taken upstairs with RN and tele tech via [employer] on cardiac monitor and O2. Pt's belongings and paperwork sent upstairs with pt, sister, and staff.

## 2019-08-25 NOTE — PROGRESS NOTES
Patient O2 sat in the 70s. Increased O2 to 10L from 6L. Patient now in the low 90s. Respiratory called for evaluation.

## 2019-08-25 NOTE — DIETARY
"Nutrition Support Assessment:  Day 0 of admit.  Arian Ramsey is a 49 y.o. male with admitting DX of aspiration pneumonia     Current problem list:  1. Acute on chronic respiratory failure  2. Aspiration pneumonia  3. Down's syndrome  4. Overweight    RD met with pt and family member at bedside. States pt tolerates all food, only had swallowing issues the evening before admittance. Relays that pt does not have front teeth, mainly just back teeth. States UBW of pt is 136 lbs, though unsure when last weighed this. No questions or concerns at this time.      Assessment:  Estimated Nutritional Needs based on:   Height: 149.9 cm (4' 11.02\")  Weight: 58.1 kg (128 lb 1.4 oz) via Stand up scale   Weight to Use in Calculations: 58.1 kg (128 lb 1.4 oz)  Ideal Body Weight: 47.2 kg (104 lb)  Body mass index is 25.86 kg/m²., BMI classification: Overweight     Calculation/Equation: MSJ x 1.2 =1529 kcal  Total Calories / day: 1529 - 1728  (Calories / k - 30)  Total Grams Protein / day: 64 - 70 (Grams Protein / k.1 - 1.2)     Evaluation:   1. Noted per H&P, pt in usual state of health until evening prior to admission. Noted pt swallowed something incorrectly and was unable to further swallow liquids or other foods without coughing fits.   2. Indication for nutrition support: per SLP noted today, recommending NPO with consideration for source of nutrition via cortrak, prefeeding trials with SLP. Noted consider MBS prior to full PO advancement.   3. Cortrak placement pending  4. No wt information per computer hx. Family member relays potential 8 lb loss based on UBW of 136 lbs, though unsure of time frame. Pt appears adequately nourished per RD visual observation.   5. Labs: Glucose 162, BUN 26, No A1C on file   6. Meds: lovenox, senokot, continuous lactated ringers infusion   7. Last BM: , small/brown/loose  8. Standard formula appropriate at this time.      Malnutrition Risk: Does not appear to present with " malnutrition at this time. To reevaluate as indicated.      Recommendations/Plan:  1. Recommend start Fibersource HN @ 25 ml/hr to advance per protocol tp goal rate of 55 mL/hr to provide 1584 kcal, 71 g protein, and 1069 mL free water.   2. Fluids per MD  3. Monitor weight  4.  Safest PO diet per SLP as medically feasible     RD following

## 2019-08-26 ENCOUNTER — APPOINTMENT (OUTPATIENT)
Dept: RADIOLOGY | Facility: MEDICAL CENTER | Age: 49
DRG: 177 | End: 2019-08-26
Attending: INTERNAL MEDICINE
Payer: MEDICARE

## 2019-08-26 PROBLEM — E43 SEVERE PROTEIN-CALORIE MALNUTRITION (HCC): Status: ACTIVE | Noted: 2019-08-26

## 2019-08-26 PROBLEM — R47.02 DYSPHASIA: Status: ACTIVE | Noted: 2019-08-26

## 2019-08-26 LAB
ANION GAP SERPL CALC-SCNC: 6 MMOL/L (ref 0–11.9)
BASOPHILS # BLD AUTO: 0.4 % (ref 0–1.8)
BASOPHILS # BLD: 0.06 K/UL (ref 0–0.12)
BUN SERPL-MCNC: 19 MG/DL (ref 8–22)
CALCIUM SERPL-MCNC: 8.9 MG/DL (ref 8.5–10.5)
CHLORIDE SERPL-SCNC: 105 MMOL/L (ref 96–112)
CO2 SERPL-SCNC: 28 MMOL/L (ref 20–33)
CREAT SERPL-MCNC: 1.08 MG/DL (ref 0.5–1.4)
CRP SERPL HS-MCNC: 18.54 MG/DL (ref 0–0.75)
EOSINOPHIL # BLD AUTO: 0.01 K/UL (ref 0–0.51)
EOSINOPHIL NFR BLD: 0.1 % (ref 0–6.9)
ERYTHROCYTE [DISTWIDTH] IN BLOOD BY AUTOMATED COUNT: 53.9 FL (ref 35.9–50)
GLUCOSE SERPL-MCNC: 121 MG/DL (ref 65–99)
HCT VFR BLD AUTO: 46.9 % (ref 42–52)
HGB BLD-MCNC: 15.1 G/DL (ref 14–18)
IMM GRANULOCYTES # BLD AUTO: 0.08 K/UL (ref 0–0.11)
IMM GRANULOCYTES NFR BLD AUTO: 0.5 % (ref 0–0.9)
LYMPHOCYTES # BLD AUTO: 1.29 K/UL (ref 1–4.8)
LYMPHOCYTES NFR BLD: 8.3 % (ref 22–41)
MCH RBC QN AUTO: 32.8 PG (ref 27–33)
MCHC RBC AUTO-ENTMCNC: 32.2 G/DL (ref 33.7–35.3)
MCV RBC AUTO: 102 FL (ref 81.4–97.8)
MONOCYTES # BLD AUTO: 0.64 K/UL (ref 0–0.85)
MONOCYTES NFR BLD AUTO: 4.1 % (ref 0–13.4)
NEUTROPHILS # BLD AUTO: 13.55 K/UL (ref 1.82–7.42)
NEUTROPHILS NFR BLD: 86.6 % (ref 44–72)
NRBC # BLD AUTO: 0 K/UL
NRBC BLD-RTO: 0 /100 WBC
PLATELET # BLD AUTO: 160 K/UL (ref 164–446)
PMV BLD AUTO: 9.8 FL (ref 9–12.9)
POTASSIUM SERPL-SCNC: 4 MMOL/L (ref 3.6–5.5)
PREALB SERPL-MCNC: 17 MG/DL (ref 18–38)
PROCALCITONIN SERPL-MCNC: 2.33 NG/ML
RBC # BLD AUTO: 4.6 M/UL (ref 4.7–6.1)
SODIUM SERPL-SCNC: 139 MMOL/L (ref 135–145)
WBC # BLD AUTO: 15.6 K/UL (ref 4.8–10.8)

## 2019-08-26 PROCEDURE — 770020 HCHG ROOM/CARE - TELE (206)

## 2019-08-26 PROCEDURE — 85025 COMPLETE CBC W/AUTO DIFF WBC: CPT

## 2019-08-26 PROCEDURE — 700105 HCHG RX REV CODE 258: Performed by: HOSPITALIST

## 2019-08-26 PROCEDURE — 99233 SBSQ HOSP IP/OBS HIGH 50: CPT | Performed by: INTERNAL MEDICINE

## 2019-08-26 PROCEDURE — 36415 COLL VENOUS BLD VENIPUNCTURE: CPT

## 2019-08-26 PROCEDURE — 84145 PROCALCITONIN (PCT): CPT

## 2019-08-26 PROCEDURE — 80048 BASIC METABOLIC PNL TOTAL CA: CPT

## 2019-08-26 PROCEDURE — 700102 HCHG RX REV CODE 250 W/ 637 OVERRIDE(OP): Performed by: INTERNAL MEDICINE

## 2019-08-26 PROCEDURE — A9270 NON-COVERED ITEM OR SERVICE: HCPCS | Performed by: INTERNAL MEDICINE

## 2019-08-26 PROCEDURE — 86140 C-REACTIVE PROTEIN: CPT

## 2019-08-26 PROCEDURE — 700111 HCHG RX REV CODE 636 W/ 250 OVERRIDE (IP): Performed by: HOSPITALIST

## 2019-08-26 PROCEDURE — 84134 ASSAY OF PREALBUMIN: CPT

## 2019-08-26 RX ADMIN — SODIUM CHLORIDE, POTASSIUM CHLORIDE, SODIUM LACTATE AND CALCIUM CHLORIDE: 600; 310; 30; 20 INJECTION, SOLUTION INTRAVENOUS at 02:07

## 2019-08-26 RX ADMIN — AMPICILLIN AND SULBACTAM 3 G: 2; 1 INJECTION, POWDER, FOR SOLUTION INTRAVENOUS at 21:50

## 2019-08-26 RX ADMIN — ACETAMINOPHEN 650 MG: 325 TABLET, FILM COATED ORAL at 06:50

## 2019-08-26 RX ADMIN — AMPICILLIN AND SULBACTAM 3 G: 2; 1 INJECTION, POWDER, FOR SOLUTION INTRAVENOUS at 04:59

## 2019-08-26 RX ADMIN — ENOXAPARIN SODIUM 40 MG: 100 INJECTION SUBCUTANEOUS at 05:04

## 2019-08-26 RX ADMIN — AMPICILLIN AND SULBACTAM 3 G: 2; 1 INJECTION, POWDER, FOR SOLUTION INTRAVENOUS at 09:02

## 2019-08-26 RX ADMIN — SODIUM CHLORIDE, POTASSIUM CHLORIDE, SODIUM LACTATE AND CALCIUM CHLORIDE: 600; 310; 30; 20 INJECTION, SOLUTION INTRAVENOUS at 18:09

## 2019-08-26 RX ADMIN — AMPICILLIN AND SULBACTAM 3 G: 2; 1 INJECTION, POWDER, FOR SOLUTION INTRAVENOUS at 15:49

## 2019-08-26 RX ADMIN — ACETAMINOPHEN 650 MG: 325 TABLET, FILM COATED ORAL at 15:49

## 2019-08-26 ASSESSMENT — PATIENT HEALTH QUESTIONNAIRE - PHQ9
SUM OF ALL RESPONSES TO PHQ9 QUESTIONS 1 AND 2: 0
1. LITTLE INTEREST OR PLEASURE IN DOING THINGS: NOT AT ALL
2. FEELING DOWN, DEPRESSED, IRRITABLE, OR HOPELESS: NOT AT ALL

## 2019-08-26 NOTE — RESPIRATORY CARE
COPD EDUCATION by COPD CLINICAL EDUCATOR  8/26/2019 at 7:20 AM by Keisha Lund     Patient reviewed by COPD education team. Patient does not have a history or diagnosis of COPD and is a non-smoker, therefore does not qualify for the COPD program.

## 2019-08-26 NOTE — PROGRESS NOTES
Cortrak Placement    Tube Team verified patient name and medical record number prior to tube placement.  Cortrak tube (43 inches, 10 Scottish) placed at 70 cm in right nare.  Per Cortrak picture, tube appears to be in the stomach.  Nursing Instructions: Awaiting KUB to confirm placement before use for medications or feeding. Once placement confirmed, flush tube with 30 ml of water, and then remove and save stylet, in patient medication drawer.

## 2019-08-26 NOTE — ASSESSMENT & PLAN NOTE
Seen by speech therapy  Requiring core tract for tube feeding  8/28 started on dysphagia diet.  We will keep core track until tomorrow to ensure adequate oral intake

## 2019-08-26 NOTE — PROGRESS NOTES
Cortrak Placement    Tube Team verified patient name and medical record number prior to tube placement.  Cortrak tube (43 inches, 10 Wallisian) placed at 70 cm in right nare.  Per Cortrak picture, tube appears to be in the stomach.    Nursing Instructions: Awaiting KUB to confirm placement before use for medications or feeding. Once placement confirmed, flush tube with 30 ml of water, and then remove and save stylet, in patient medication drawer.

## 2019-08-26 NOTE — PROGRESS NOTES
Hospital Medicine Daily Progress Note    Date of Service  8/26/2019    Chief Complaint  49 y.o. male admitted 8/24/2019 with history of nonverbal Down syndrome presents with dysphasia and aspiration pneumonia.  He is now requiring core track for tube feeds.  He is currently on IV antibiotics.    Hospital Course    49 y.o. male admitted 8/24/2019 with history of nonverbal Down syndrome presents with dysphasia and aspiration pneumonia.  He is now requiring core track for tube feeds.  He is currently on IV antibiotics.      Interval Problem Update  Appears comfortable  cortrak replaced  Father at bedside, poc reviewed  Family wants restraints to avoid pulling at cortrak    Consultants/Specialty  none    Code Status  Full    Disposition  TBD    Review of Systems  Review of Systems   Unable to perform ROS: Mental acuity        Physical Exam  Temp:  [36.6 °C (97.9 °F)-38 °C (100.4 °F)] 37.1 °C (98.8 °F)  Pulse:  [74-94] 94  Resp:  [16-20] 18  BP: ()/(56-70) 111/69  SpO2:  [92 %-96 %] 96 %    Physical Exam   Constitutional: He is oriented to person, place, and time. He appears well-nourished. No distress.   HENT:   Head: Normocephalic and atraumatic.   Nose: Nose normal.   cortrak in place   Eyes: Pupils are equal, round, and reactive to light. EOM are normal. Right eye exhibits no discharge. Left eye exhibits no discharge. No scleral icterus.   Neck: Neck supple. No JVD present. No thyromegaly present.   Cardiovascular: Normal rate and intact distal pulses.   No murmur heard.  Pulmonary/Chest: Effort normal and breath sounds normal. No respiratory distress. He has no wheezes.   Abdominal: Soft. Bowel sounds are normal. He exhibits no distension. There is no tenderness.   Musculoskeletal: He exhibits no edema or tenderness.   Neurological: He is alert and oriented to person, place, and time. No cranial nerve deficit. Coordination normal.   Skin: Skin is warm and dry. No rash noted. He is not diaphoretic. No erythema.  No pallor.   Psychiatric: He has a normal mood and affect.   Nursing note and vitals reviewed.      Fluids    Intake/Output Summary (Last 24 hours) at 8/26/2019 1330  Last data filed at 8/25/2019 2347  Gross per 24 hour   Intake 55 ml   Output 0 ml   Net 55 ml       Laboratory  Recent Labs     08/24/19 2350 08/26/19  0236   WBC 11.2* 15.6*   RBC 5.25 4.60*   HEMOGLOBIN 17.6 15.1   HEMATOCRIT 52.3* 46.9   MCV 99.6* 102.0*   MCH 33.5* 32.8   MCHC 33.7 32.2*   RDW 50.5* 53.9*   PLATELETCT 199 160*   MPV 9.3 9.8     Recent Labs     08/24/19 2350 08/26/19  0236   SODIUM 138 139   POTASSIUM 4.3 4.0   CHLORIDE 103 105   CO2 25 28   GLUCOSE 162* 121*   BUN 26* 19   CREATININE 1.30 1.08   CALCIUM 9.3 8.9                   Imaging  DX-ABDOMEN FOR TUBE PLACEMENT   Final Result      Stable enteric tube position, tip overlying the gastric antrum      DX-ABDOMEN FOR TUBE PLACEMENT   Final Result      Feeding tube in place as noted above.      [id number] VIEW   Final Result      Enteric tube projects over the stomach.      DX-CHEST-PORTABLE (1 VIEW)   Final Result      Bilateral opacities as noted above, consistent with pulmonary edema.           Assessment/Plan  * Aspiration pneumonia (HCC)  Assessment & Plan  8/26 aspiration event on evening prior to admission.    -Blood cultures pending  Continues Unasyn  Oxygen supplementation as needed  Ongoing leukocytosis  Positive procalcitonin of 2.3        Severe protein-calorie malnutrition (HCC)  Assessment & Plan  Pre-albumin of 17  Continue core tract for tube feeds  Dietary following    Dysphasia  Assessment & Plan  Seen by speech therapy  Requiring core tract for tube feeding      Overweight (BMI 25.0-29.9)  Assessment & Plan  Body mass index is 25.87 kg/m².      Down's syndrome  Assessment & Plan  Non-verbal at baseline.  Will benefit from sister being at bedside if this can be accommodated.      8/26 pulling at cortrak, restraints prn    Acute on chronic respiratory failure  (HCC)  Assessment & Plan  Typically on 3-4L oxygen at home at baseline.  Now requiring face mask.  Titrate down as tolerated.         VTE prophylaxis: lovenox

## 2019-08-26 NOTE — PROGRESS NOTES
Patient trying to get out of bed to restroom and pulled out cortrak. Charge RN, Flor at bedside for reinsertion of Cortrak

## 2019-08-26 NOTE — PROGRESS NOTES
Chart Check/ Rhythm Check    Rhythm: Normal sinus- 77-90 rate  Occasional: PAC's, PVC;s  Measurements: 0.16 / 0.10 / 0.36

## 2019-08-26 NOTE — CONSULTS
"Reason for PC Consult: Advance Care Planning    Consulted by: Dr. Anguiano    Assessment:  General:   49-year-old male with Down syndrome presents status post cough while eating. Pt admitted for aspiration pneumonia with concerns for dysphasia. No other health history on file.       Dyspnea: No  Last BM: 08/25/19  Pain: No  Depression: Mood appropriate for situation  Dementia: No    Spiritual:  Is Anglican or spirituality important for coping with this illness? No  Has a  or spiritual provider visit been requested? No    Palliative Performance Scale: 40%    Advance Directive: None  DPOA: No-NOK is his father Teodoro  POLST:     Code Status: Full    Social: Pt lives with his father Teodoro. His brother Avelino Castro lives on Teodoro's property in a trailer. Pt's sister Elizabeth Short lives in [place] also, she is involved in pt's life.     Outcome:  Introduced self and role of Palliative Care to pt and his father Teodoro. Pt shakes PC RN's hand and when asked he states he like to be called \"Dev.\" PC RN asked pt how he is feeling, pt states \"I've been better\" and laughs. Assessed father's understanding of pt's current medical status, overall health picture, and options for future care. Pt's father explains that it was Dev's birthday and he \"choked\" on potato salad. Father states that Dev was in usual state of health prior to admission. He states Dev has never had difficulty eating or swallowing before.     Explored pt's values, beliefs, and preferences in order to identify GOC. Teodoro explains that Dev has a \"simple life.\" He spends most of the day watching TV, eating his meals, and visiting with family or friends who visit. PC RN explores family's understanding of pt's current dysphagia. Teodoro states, \"no one has told us what is going on.\" Teodoro does understand that pt has pneumonia from being unable to swallow. PC RN explains PEG tube placement. Teodoro states, \"I'll do whatever it takes to keep him alive.\" PC RN " "discusses quality of life, Teodoro believes PEG tube would still provide quality of life for Dev.     Re-addressed code status including what resuscitation looks like. Teodoro would like Dev to remain full code, understanding that this involves mechanical ventilation and chest compressions. PC RN again emphasizes quality of life. Teodoro states, \"anyway I can keep him alive I will.\" Teodoro understands that Dev will have cortrak in place at this time.     Active listening, reflection, reminiscing, validation & normalization, and empathic support utilized throughout this encounter.  All questions answered.  PC contact information given.       Updated: CHARLY Spence and Dr. Anguiano     Plan: Full code. Ok for cortrak. PC team will follow as clinical picture evolves.     Thank you for allowing Palliative Care to participate in this patient's care. Please feel free to call [phone] with any questions or concerns.  "

## 2019-08-26 NOTE — CARE PLAN
Problem: Communication  Goal: The ability to communicate needs accurately and effectively will improve  Outcome: PROGRESSING AS EXPECTED  Intervention: Educate patient and significant other/support system about the plan of care, procedures, treatments, medications and allow for questions  Note:   Family requests to have things explained in detail so they are aware and can help make decisions for patient. Patient is non verbal but can communicate simple needs     Problem: Safety  Goal: Will remain free from falls  Outcome: PROGRESSING AS EXPECTED  Intervention: Implement fall precautions  Note:   Bed in lowest position, call light within family reach. Family aware of need to call before getting patient up. Patient obeying instruction very well.

## 2019-08-26 NOTE — CARE PLAN
Problem: Communication  Goal: The ability to communicate needs accurately and effectively will improve  Outcome: PROGRESSING AS EXPECTED  Family at bedside to assist with patient communication. Patient able to follow simple commands and complies with medical treatment.     Problem: Safety  Goal: Will remain free from injury  Outcome: PROGRESSING AS EXPECTED  Family at bedside 24/7, bed alarm in use, side rails up, bed low and locked. Frequent rounding performed by RN.     Problem: Infection  Goal: Will remain free from infection  Outcome: PROGRESSING AS EXPECTED   Patient receiving abx as ordered.

## 2019-08-26 NOTE — PROGRESS NOTES
Cortrak Placement    Tube Team verified patient name and medical record number prior to tube placement.  Cortrak tube (43 inches, 10 T[place]ese) placed at 60 cm in left nare.  Per Cortrak picture, tube appears to be in the stomach.  Nursing Instructions: Awaiting abdominal Xray to confirm placement before use for medications or feeding. Once placement confirmed, flush tube with 30 ml of water, and then remove and save stylet, in patient medication drawer.       Attempted to place in Right nare with some resistance and no advancement. Left nare with no resistance.

## 2019-08-26 NOTE — PROGRESS NOTES
Hospitalist paged in regards to patient jarred. OK given to RN to start tube feeds. See doc flowsheets.

## 2019-08-27 LAB
ANION GAP SERPL CALC-SCNC: 3 MMOL/L (ref 0–11.9)
BASOPHILS # BLD AUTO: 0.5 % (ref 0–1.8)
BASOPHILS # BLD: 0.05 K/UL (ref 0–0.12)
BUN SERPL-MCNC: 18 MG/DL (ref 8–22)
CALCIUM SERPL-MCNC: 8.9 MG/DL (ref 8.5–10.5)
CHLORIDE SERPL-SCNC: 106 MMOL/L (ref 96–112)
CO2 SERPL-SCNC: 32 MMOL/L (ref 20–33)
CREAT SERPL-MCNC: 0.98 MG/DL (ref 0.5–1.4)
EOSINOPHIL # BLD AUTO: 0.09 K/UL (ref 0–0.51)
EOSINOPHIL NFR BLD: 0.9 % (ref 0–6.9)
ERYTHROCYTE [DISTWIDTH] IN BLOOD BY AUTOMATED COUNT: 52.8 FL (ref 35.9–50)
GLUCOSE SERPL-MCNC: 134 MG/DL (ref 65–99)
HCT VFR BLD AUTO: 43.1 % (ref 42–52)
HGB BLD-MCNC: 14.2 G/DL (ref 14–18)
IMM GRANULOCYTES # BLD AUTO: 0.05 K/UL (ref 0–0.11)
IMM GRANULOCYTES NFR BLD AUTO: 0.5 % (ref 0–0.9)
LYMPHOCYTES # BLD AUTO: 1.28 K/UL (ref 1–4.8)
LYMPHOCYTES NFR BLD: 13.3 % (ref 22–41)
MCH RBC QN AUTO: 33.7 PG (ref 27–33)
MCHC RBC AUTO-ENTMCNC: 32.9 G/DL (ref 33.7–35.3)
MCV RBC AUTO: 102.4 FL (ref 81.4–97.8)
MONOCYTES # BLD AUTO: 0.57 K/UL (ref 0–0.85)
MONOCYTES NFR BLD AUTO: 5.9 % (ref 0–13.4)
NEUTROPHILS # BLD AUTO: 7.61 K/UL (ref 1.82–7.42)
NEUTROPHILS NFR BLD: 78.9 % (ref 44–72)
NRBC # BLD AUTO: 0 K/UL
NRBC BLD-RTO: 0 /100 WBC
PLATELET # BLD AUTO: 144 K/UL (ref 164–446)
PMV BLD AUTO: 9.9 FL (ref 9–12.9)
POTASSIUM SERPL-SCNC: 3.8 MMOL/L (ref 3.6–5.5)
RBC # BLD AUTO: 4.21 M/UL (ref 4.7–6.1)
SODIUM SERPL-SCNC: 141 MMOL/L (ref 135–145)
WBC # BLD AUTO: 9.7 K/UL (ref 4.8–10.8)

## 2019-08-27 PROCEDURE — 80048 BASIC METABOLIC PNL TOTAL CA: CPT

## 2019-08-27 PROCEDURE — 99232 SBSQ HOSP IP/OBS MODERATE 35: CPT | Performed by: INTERNAL MEDICINE

## 2019-08-27 PROCEDURE — A9270 NON-COVERED ITEM OR SERVICE: HCPCS | Performed by: INTERNAL MEDICINE

## 2019-08-27 PROCEDURE — 700111 HCHG RX REV CODE 636 W/ 250 OVERRIDE (IP): Performed by: HOSPITALIST

## 2019-08-27 PROCEDURE — 700105 HCHG RX REV CODE 258: Performed by: HOSPITALIST

## 2019-08-27 PROCEDURE — 85025 COMPLETE CBC W/AUTO DIFF WBC: CPT

## 2019-08-27 PROCEDURE — 770020 HCHG ROOM/CARE - TELE (206)

## 2019-08-27 PROCEDURE — 36415 COLL VENOUS BLD VENIPUNCTURE: CPT

## 2019-08-27 PROCEDURE — 700102 HCHG RX REV CODE 250 W/ 637 OVERRIDE(OP): Performed by: INTERNAL MEDICINE

## 2019-08-27 RX ADMIN — ENOXAPARIN SODIUM 40 MG: 100 INJECTION SUBCUTANEOUS at 05:28

## 2019-08-27 RX ADMIN — SENNOSIDES, DOCUSATE SODIUM 2 TABLET: 50; 8.6 TABLET, FILM COATED ORAL at 05:28

## 2019-08-27 RX ADMIN — AMPICILLIN AND SULBACTAM 3 G: 2; 1 INJECTION, POWDER, FOR SOLUTION INTRAVENOUS at 14:17

## 2019-08-27 RX ADMIN — SENNOSIDES, DOCUSATE SODIUM 2 TABLET: 50; 8.6 TABLET, FILM COATED ORAL at 17:25

## 2019-08-27 RX ADMIN — AMPICILLIN AND SULBACTAM 3 G: 2; 1 INJECTION, POWDER, FOR SOLUTION INTRAVENOUS at 03:00

## 2019-08-27 RX ADMIN — SODIUM CHLORIDE, POTASSIUM CHLORIDE, SODIUM LACTATE AND CALCIUM CHLORIDE: 600; 310; 30; 20 INJECTION, SOLUTION INTRAVENOUS at 09:20

## 2019-08-27 RX ADMIN — AMPICILLIN AND SULBACTAM 3 G: 2; 1 INJECTION, POWDER, FOR SOLUTION INTRAVENOUS at 08:01

## 2019-08-27 RX ADMIN — AMPICILLIN AND SULBACTAM 3 G: 2; 1 INJECTION, POWDER, FOR SOLUTION INTRAVENOUS at 20:28

## 2019-08-27 NOTE — CARE PLAN
Problem: Nutritional:  Goal: Nutrition support tolerated and meeting greater than 85% of estimated needs  Outcome: MET     Cortrak pulled out yesterday then replaced per RN. Tubefeed is running at goal rate currently with no current issues tolerating.    RD to monitor weekly.

## 2019-08-27 NOTE — PROGRESS NOTES
Hospital Medicine Daily Progress Note    Date of Service  8/27/2019    Chief Complaint  49 y.o. male admitted 8/24/2019 with history of nonverbal Down syndrome presents with dysphasia and aspiration pneumonia.  He is now requiring core track for tube feeds.  He is currently on IV antibiotics.    Hospital Course    49 y.o. male admitted 8/24/2019 with history of nonverbal Down syndrome presents with dysphasia and aspiration pneumonia.  He is now requiring core track for tube feeds.  He is currently on IV antibiotics.      Interval Problem Update  Restraints in place due to tendency to pull out cortrack  On 6 L oxygen mask  POC reviewed with the father at bedside.  White blood cells went down to 9.7.  Afebrile.  Tolerating tube feeding.  Reportedly having regular bowel movements  Post discharge placement recommended by speech therapy.  Will order referral to SNF.  Discussed with .  Ordered PT OT evaluation.    Consultants/Specialty  none    Code Status  Full    Disposition  SNF    Review of Systems  Review of Systems   Unable to perform ROS: Mental acuity        Physical Exam  Temp:  [36.1 °C (97 °F)-37.3 °C (99.1 °F)] 36.1 °C (97 °F)  Pulse:  [60-80] 70  Resp:  [18-20] 18  BP: ()/(53-69) 99/59  SpO2:  [92 %-96 %] 93 %    Physical Exam   Constitutional: He appears well-nourished. No distress.   HENT:   Head: Normocephalic and atraumatic.   Nose: Nose normal.   Mouth/Throat: Mucous membranes are dry.   cortrak in place    Nasal mask with oxygen 6 L   Eyes: Pupils are equal, round, and reactive to light. EOM are normal. Right eye exhibits no discharge. Left eye exhibits no discharge. No scleral icterus.   Neck: Neck supple. No JVD present. No thyromegaly present.   Cardiovascular: Normal rate and intact distal pulses.   No murmur heard.  Pulmonary/Chest: Effort normal and breath sounds normal. No respiratory distress. He has no wheezes.   Abdominal: Soft. Bowel sounds are normal. He exhibits no  distension. There is no tenderness.   Musculoskeletal: He exhibits no edema or tenderness.   Neurological: He is alert. No cranial nerve deficit. Coordination normal.   Not answering orientation questions   Skin: Skin is warm and dry. No rash noted. He is not diaphoretic. No erythema. No pallor.   Psychiatric: He has a normal mood and affect. Cognition and memory are impaired.   Nursing note and vitals reviewed.      Fluids    Intake/Output Summary (Last 24 hours) at 8/27/2019 1544  Last data filed at 8/27/2019 1200  Gross per 24 hour   Intake 280 ml   Output --   Net 280 ml       Laboratory  Recent Labs     08/24/19 2350 08/26/19  0236 08/27/19  0236   WBC 11.2* 15.6* 9.7   RBC 5.25 4.60* 4.21*   HEMOGLOBIN 17.6 15.1 14.2   HEMATOCRIT 52.3* 46.9 43.1   MCV 99.6* 102.0* 102.4*   MCH 33.5* 32.8 33.7*   MCHC 33.7 32.2* 32.9*   RDW 50.5* 53.9* 52.8*   PLATELETCT 199 160* 144*   MPV 9.3 9.8 9.9     Recent Labs     08/24/19 2350 08/26/19 0236 08/27/19 0236   SODIUM 138 139 141   POTASSIUM 4.3 4.0 3.8   CHLORIDE 103 105 106   CO2 25 28 32   GLUCOSE 162* 121* 134*   BUN 26* 19 18   CREATININE 1.30 1.08 0.98   CALCIUM 9.3 8.9 8.9                   Imaging  DX-ABDOMEN FOR TUBE PLACEMENT   Final Result      Stable enteric tube position, tip overlying the gastric antrum      DX-ABDOMEN FOR TUBE PLACEMENT   Final Result      Feeding tube in place as noted above.      [id number] VIEW   Final Result      Enteric tube projects over the stomach.      DX-CHEST-PORTABLE (1 VIEW)   Final Result      Bilateral opacities as noted above, consistent with pulmonary edema.           Assessment/Plan  * Aspiration pneumonia (HCC)  Assessment & Plan  8/26 aspiration event on evening prior to admission.    -Blood cultures pending  Continues Unasyn  Oxygen supplementation as needed  Ongoing leukocytosis  Positive procalcitonin of 2.3  8/27 blood culture from 8/25 negative for 48 hours  Respiratory status stable on 6 L oxygen mask  White  blood cells went down to 9.7      Severe protein-calorie malnutrition (HCC)  Assessment & Plan  Pre-albumin of 17  Continue core tract for tube feeds  Dietary following    Dysphasia  Assessment & Plan  Seen by speech therapy  Requiring core tract for tube feeding  Referred to SNF for continued therapy    Overweight (BMI 25.0-29.9)  Assessment & Plan  Body mass index is 25.87 kg/m².      Down's syndrome  Assessment & Plan  Non-verbal at baseline.  Will benefit from sister being at bedside if this can be accommodated.      8/26 pulling at cortrak, restraints prn    No history of dysphagia prior, now requiring n.p.o./core track tube feeds  Will need continued speech therapy, referral to SNF placed    Acute on chronic respiratory failure (HCC)  Assessment & Plan  Typically on 3-4L oxygen at home at baseline.  Now requiring face mask.  Titrate down as tolerated.         VTE prophylaxis: lovenox

## 2019-08-27 NOTE — CARE PLAN
Pt remains free from falls at this time. Safety precautions in place. Pt educated on calling for assistance when needed.   Educated sister and father of the pt on plan of care, medications, and treatments ordered. All questions answered.

## 2019-08-28 ENCOUNTER — APPOINTMENT (OUTPATIENT)
Dept: RADIOLOGY | Facility: MEDICAL CENTER | Age: 49
DRG: 177 | End: 2019-08-28
Attending: INTERNAL MEDICINE
Payer: MEDICARE

## 2019-08-28 PROCEDURE — 74230 X-RAY XM SWLNG FUNCJ C+: CPT

## 2019-08-28 PROCEDURE — 770020 HCHG ROOM/CARE - TELE (206)

## 2019-08-28 PROCEDURE — 97166 OT EVAL MOD COMPLEX 45 MIN: CPT

## 2019-08-28 PROCEDURE — 700111 HCHG RX REV CODE 636 W/ 250 OVERRIDE (IP): Performed by: HOSPITALIST

## 2019-08-28 PROCEDURE — 700105 HCHG RX REV CODE 258: Performed by: HOSPITALIST

## 2019-08-28 PROCEDURE — 71045 X-RAY EXAM CHEST 1 VIEW: CPT

## 2019-08-28 PROCEDURE — 92526 ORAL FUNCTION THERAPY: CPT

## 2019-08-28 PROCEDURE — 97161 PT EVAL LOW COMPLEX 20 MIN: CPT

## 2019-08-28 PROCEDURE — 99232 SBSQ HOSP IP/OBS MODERATE 35: CPT | Performed by: INTERNAL MEDICINE

## 2019-08-28 PROCEDURE — 92611 MOTION FLUOROSCOPY/SWALLOW: CPT

## 2019-08-28 RX ORDER — LIOTHYRONINE SODIUM 25 UG/1
25 TABLET ORAL
Status: DISCONTINUED | OUTPATIENT
Start: 2019-08-29 | End: 2019-08-29 | Stop reason: HOSPADM

## 2019-08-28 RX ORDER — LEVOTHYROXINE SODIUM 0.07 MG/1
150 TABLET ORAL
Status: DISCONTINUED | OUTPATIENT
Start: 2019-08-29 | End: 2019-08-29 | Stop reason: HOSPADM

## 2019-08-28 RX ORDER — LIOTHYRONINE SODIUM 25 UG/1
25 TABLET ORAL DAILY
Status: ON HOLD | COMMUNITY
End: 2021-12-31

## 2019-08-28 RX ORDER — SIMVASTATIN 40 MG
40 TABLET ORAL EVERY EVENING
Status: DISCONTINUED | OUTPATIENT
Start: 2019-08-29 | End: 2019-08-29 | Stop reason: HOSPADM

## 2019-08-28 RX ADMIN — SODIUM CHLORIDE, POTASSIUM CHLORIDE, SODIUM LACTATE AND CALCIUM CHLORIDE: 600; 310; 30; 20 INJECTION, SOLUTION INTRAVENOUS at 00:25

## 2019-08-28 RX ADMIN — ENOXAPARIN SODIUM 40 MG: 100 INJECTION SUBCUTANEOUS at 06:07

## 2019-08-28 RX ADMIN — AMPICILLIN AND SULBACTAM 3 G: 2; 1 INJECTION, POWDER, FOR SOLUTION INTRAVENOUS at 04:00

## 2019-08-28 ASSESSMENT — COGNITIVE AND FUNCTIONAL STATUS - GENERAL
DAILY ACTIVITIY SCORE: 16
TOILETING: A LOT
EATING MEALS: A LITTLE
MOBILITY SCORE: 17
CLIMB 3 TO 5 STEPS WITH RAILING: A LOT
TURNING FROM BACK TO SIDE WHILE IN FLAT BAD: A LITTLE
DRESSING REGULAR UPPER BODY CLOTHING: A LITTLE
PERSONAL GROOMING: A LITTLE
MOVING FROM LYING ON BACK TO SITTING ON SIDE OF FLAT BED: A LITTLE
SUGGESTED CMS G CODE MODIFIER MOBILITY: CK
HELP NEEDED FOR BATHING: A LITTLE
DRESSING REGULAR LOWER BODY CLOTHING: A LOT
WALKING IN HOSPITAL ROOM: A LITTLE
SUGGESTED CMS G CODE MODIFIER DAILY ACTIVITY: CK
MOVING TO AND FROM BED TO CHAIR: A LITTLE
STANDING UP FROM CHAIR USING ARMS: A LITTLE

## 2019-08-28 ASSESSMENT — GAIT ASSESSMENTS
DISTANCE (FEET): 400
DEVIATION: OTHER (COMMENT)
ASSISTIVE DEVICE: FRONT WHEEL WALKER
GAIT LEVEL OF ASSIST: SUPERVISED

## 2019-08-28 ASSESSMENT — PAIN SCALES - WONG BAKER
WONGBAKER_NUMERICALRESPONSE: DOESN'T HURT AT ALL

## 2019-08-28 ASSESSMENT — ACTIVITIES OF DAILY LIVING (ADL): TOILETING: REQUIRES ASSIST

## 2019-08-28 NOTE — THERAPY
Speech Language Therapy MBS completed.  Functional Status: Pt presents with a moderate oral and mild pharyngeal dysphagia. Pt was presented with thin liquids via tsp/cup/straw, nectar thick liquids via tsp/cup, purees and soft solids. Decreased oral control and lingual pumping resulted in premature spillage of liquids to the valleculae and lateral channels before the swallow was initiated. Lingual weakness resulted in mild palatal residue, which cleared with a thin liquid rinse. Decreased tongue base retractio, reduced epiglottic inversion resulted in trace vallecular residue of liquids/purees and moderate soft solid residue, which cleared with a 2nd swallow and/or thin liquid rinse. Follow up swallows were inconsistent and pt did not follow directives to swallow a 2nd time when cued; therefore, a thin liquid rinse is recommended to clear pharyngeal residue. A minimal amount of thin and nectar thick liquid residue was present in the left lateral channel after the swallow, seen in both lateral and A-P views. This eventually cleared with subsequent swallows. Unclear if this residue is related to an anatomical abnormality (e.g., deep lateral channel) vs. physiological deficit, though it did not appear to cause any penetration into the airway. No penetration or aspiration was seen on this study, though pt is at at risk of aspirating chewable solids due to moderate oral phase deficits and amount of residue seen in valleculae, to which pt was not sensate and required cuing to clear. Recommend initiating a diet of pureed solids, thin liquids with 1:1 feeding A. Please wait to pull Cortrak until pt has safely tolerated at least 1-2 meals with no s/sx of aspiration.    Recommendations - Diet: Dysphagia 1/Thin liquids                          Strategies: Strict 1:1 feeding , Assistance needed for meal tray set-up and Head of Bed at 90 Degrees                          Medication Administration: crush in puree  Plan of Care:  "Will benefit from Speech Therapy 3 times per week  Post-Acute Therapy: Recommend inpatient transitional care services for continued speech therapy services. If family chooses to take patient home, would recommend home health for continued speech therapy.         See \"Rehab Therapy-Acute\" Patient Summary Report for complete documentation.   "

## 2019-08-28 NOTE — THERAPY
"Occupational Therapy Evaluation completed.   Functional Status: Pt is a 48yo male admitted for aspiration PNA and dysphagia. PMHx of \"non verbal\" Down Syndrome but able to provide one word answers to simple questions. Supine>sit with SPV. LB dressing with SPV. Sit>stand with SPV and FWW. Functional ambulation with FWW and min A for facilitation of FWW; poor vision at baseline. Sister reports better in familiar environments. Toilet txf with  Min A, and toileting with max A for pericare and clothing management. Family reports able to assist PRN. Into hallway with PT, and BTB with SPV. Restraints reapplied; left with sister in room. Sister reports pt is at or just below baseline, and family is able to provide 24/7 assist and SPV. Recommend acute OT for d/c needs only, and no further transitional care prior to d/c home with family provide 24/7 assist and SPV at baseline.    Plan of Care: Will benefit from Occupational Therapy for d/c needs only   Discharge Recommendations:  Equipment: Grab Bars. Post-acute therapy: No further transitional care prior to d/c home with family provide 24/7 assist and SPV at baseline       See \"Rehab Therapy-Acute\" Patient Summary Report for complete documentation.    "

## 2019-08-28 NOTE — PALLIATIVE CARE
"Palliative Care follow-up  PC RN met with pt and his sister Elizabeth to discuss plan of care. Elizabeth verbalized frustration, \"they aren't giving him a chance to swallow.\" PC RN explained that SLP did formal eval and pt failed which indicated need for cortrak. PC RN explained that SLP will return to continue monitoring swallowing. At this time pt's cousin and brother Avelino walk in and Elizabeth and PC RN continue conversation privately in conference room.    Elizabeth explains that pt is at his baseline mobility. She is very concerned about pt going to a SNF because pt hates to be away from home, Elizabeth feels it is unnecessary to do SNF because pt is at baseline function other than his inability to swallow. PC RN explores pt and family's opinion on PEG tube placement if it were necessary. Elizabeth feels that PEG tube would be an ok option for the pt because he probably would not miss food. PC RN explains that if pt does not regain the ability to swallow safely PEG would be included in the next steps.     PC RN explores Elizabeth's expectation of pt's quality of life and length of life. Elizabeth states that pt's heart is bad, \"it is working double time.\" And that pt is on oxygen at all times. Elizabeth states that she and her family have been told the pt wouldn't live past the age of [age] so they consider each day with [place] a gift.     PC RN reviews code status with Elizabeth including what attempted resuscitation looks like and placement of mechanical ventilator. Elizabeth receives phone call from pt's father Teodoro at this time and includes in him on speaker phone. Elizabeth and Teodoro agree on full code status. PC RN explores reasoning. Elizabeth begins to cry and states the pt's mother  of end stage COPD 2 months ago and the family is still mourning. They feel \"raw\" from this loss and have difficulty talking about end of life matters when it comes to the pt. PC RN validates this difficult situation and provides emotional support. PC RN " "discusses quality of life in regards to CPR, but Elizabeth confirms they would want \"everything done\" for the pt in an attempt to keep him alive. Elizabeth states she understands CPR usually doesn't work because she has seen it done, but would want it to be attempted.     Active listening, reflection, reminiscing, validation & normalization, and empathic support utilized throughout this encounter.  All questions answered.  PC contact information given.         Plan: Full code. Continue SLP evals. PC Team will follow as clinical picture evolves.     Thank you for allowing Palliative Care to participate in this patient's care. Please feel free to call [phone] with any questions or concerns.  "

## 2019-08-28 NOTE — FACE TO FACE
Face to Face Supporting Documentation - Home Health    The encounter with this patient was in whole or in part the primary reason for home health admission.    Date of encounter:   Patient:                    MRN:                       YOB: 2020  Arian Ramsey  [id number]  1970     Home health to see patient for:  Skilled Nursing care for assessment, interventions & education, Physical Therapy evaluation and treatment, Occupational therapy evaluation and treatment and Speech Language Pathology evaluation and treatment    Skilled need for:  Recent Deterioration of Health Status Dysphagia, aspiration pneumonia    Skilled nursing interventions to include:  Comment: tube feeds, Speech therapy    Homebound status evidenced by:  Needs the assistance of another person in order to leave the home. Leaving home requires a considerable and taxing effort. There is a normal inability to leave the home.    Community Physician to provide follow up care: Arian Haney M.D.     Optional Interventions? No      I certify the face to face encounter for this home health care referral meets the CMS requirements and the encounter/clinical assessment with the patient was, in whole, or in part, for the medical condition(s) listed above, which is the primary reason for home health care. Based on my clinical findings: the service(s) are medically necessary, support the need for home health care, and the homebound criteria are met.  I certify that this patient has had a face to face encounter by myself.  Jay Fontenot M.D. - NPI: [id number]

## 2019-08-28 NOTE — CARE PLAN
Problem: Communication  Goal: The ability to communicate needs accurately and effectively will improve  Outcome: PROGRESSING AS EXPECTED     Problem: Safety  Goal: Will remain free from injury  Outcome: PROGRESSING AS EXPECTED  Goal: Will remain free from falls  Outcome: PROGRESSING AS EXPECTED     Problem: Infection  Goal: Will remain free from infection  Outcome: PROGRESSING AS EXPECTED     Problem: Venous Thromboembolism (VTW)/Deep Vein Thrombosis (DVT) Prevention:  Goal: Patient will participate in Venous Thrombosis (VTE)/Deep Vein Thrombosis (DVT)Prevention Measures  Outcome: PROGRESSING AS EXPECTED     Problem: Bowel/Gastric:  Goal: Normal bowel function is maintained or improved  Outcome: PROGRESSING AS EXPECTED  Goal: Will not experience complications related to bowel motility  Outcome: PROGRESSING AS EXPECTED     Problem: Knowledge Deficit  Goal: Knowledge of disease process/condition, treatment plan, diagnostic tests, and medications will improve  Outcome: PROGRESSING AS EXPECTED  Goal: Knowledge of the prescribed therapeutic regimen will improve  Outcome: PROGRESSING AS EXPECTED     Problem: Discharge Barriers/Planning  Goal: Patient's continuum of care needs will be met  Outcome: PROGRESSING AS EXPECTED     Problem: Fluid Volume:  Goal: Will maintain balanced intake and output  Outcome: PROGRESSING AS EXPECTED     Problem: Respiratory:  Goal: Respiratory status will improve  Outcome: PROGRESSING AS EXPECTED     Problem: Pain Management  Goal: Pain level will decrease to patient's comfort goal  Outcome: PROGRESSING AS EXPECTED     Problem: Safety - Medical Restraint  Goal: Remains free of injury from restraints (Restraint for Interference with Medical Device)  Description  INTERVENTIONS:  1. Determine that other, less restrictive measures have been tried or would not be effective before applying the restraint  2. Evaluate the patient's condition at the time of restraint application  3. Inform  patient/family regarding the reason for restraint  4. Q2H: Monitor safety, psychosocial status, comfort, nutrition and hydration  Outcome: PROGRESSING AS EXPECTED  Goal: Free from restraint(s) (Restraint for Interference with Medical Device)  Description  INTERVENTIONS:  1. ONCE/SHIFT or MINIMUM Q12H: Assess and document the continuing need for restraints  2. Q24H: Continued use of restraint requires LIP to perform face to face examination and written order  3. Identify and implement measures to help patient regain control  Outcome: PROGRESSING AS EXPECTED     Problem: Psychosocial Needs:  Goal: Level of anxiety will decrease  Outcome: PROGRESSING AS EXPECTED

## 2019-08-28 NOTE — PROGRESS NOTES
2 RN skin check complete with Sunday ULRICH, and Helena SOLORZANO   Devices in place Glasses, oxygen, cortrak, restraints.  Skin assessed under devices Yes.  Confirmed pressure ulcers found on None.  New potential pressure ulcers noted on left ear redness, none-blanching. Wound consult placed No.  The following interventions in place Mepitel foam placed on left ear, pt repositioned, mepilex for sacrum, foams in place too offload ears, extra pillows for comfort.

## 2019-08-28 NOTE — THERAPY
"Physical Therapy Evaluation completed.   Bed Mobility:  Supine to Sit: Minimal Assist  Transfers: Sit to Stand: Supervised  Gait: Level Of Assist: Supervised with Front-Wheel Walker       Plan of Care: Patient with no further skilled PT needs in the acute care setting at this time  Discharge Recommendations: Equipment: No Equipment Needed. Recommend home health transitional care for continued physical therapy services.     See \"Rehab Therapy-Acute\" Patient Summary Report for complete documentation.     Pt is a 48yo male presenting to acute with aspiration PNA. Pt with Down Syndrome. Sister present to provide PLOF and living situation, states pt has 24/7 SPV and assist, reports no concerns for return home. Pt presents to PT at his functional baseline. Performed all mobility with min A-SPV. Pt ambulated in hallways with FWW and SPV, no LOB. Pt would benefit from home health PT for gait training with FWW in his home environment and community. Patient will not be actively followed for physical therapy services at this time, however may be seen if requested by physician for 1 more visit within 30 days to address any discharge or equipment needs.  "

## 2019-08-28 NOTE — PROGRESS NOTES
Hospital Medicine Daily Progress Note    Date of Service  8/28/2019    Chief Complaint  49 y.o. male admitted 8/24/2019 with history of nonverbal Down syndrome presents with dysphasia and aspiration pneumonia.  He is now requiring core track for tube feeds.  He is currently on IV antibiotics.    Hospital Course    49 y.o. male admitted 8/24/2019 with history of nonverbal Down syndrome presents with dysphasia and aspiration pneumonia.  He is now requiring core track for tube feeds.  He is currently on IV antibiotics.      Interval Problem Update  Patient in no distress.  Vital stable.  On 6 L oxygen mask still -need to wean down to baseline, or otherwise do home O2 evaluation   SLP started on dysphagia diet.  Will need to keep core track for 2 meals to ensure adequate intake.  Family against placement.  Ordered home health.    Consultants/Specialty  none    Code Status  Full    Disposition  Home with home health after core track pulled out    Review of Systems  Review of Systems   Unable to perform ROS: Mental acuity        Physical Exam  Temp:  [36.5 °C (97.7 °F)-37.1 °C (98.8 °F)] 36.9 °C (98.4 °F)  Pulse:  [65-78] 78  Resp:  [17-20] 17  BP: (104-126)/(61-77) 112/74  SpO2:  [91 %-96 %] 96 %    Physical Exam   Constitutional: He appears well-nourished. No distress.   HENT:   Head: Normocephalic and atraumatic.   Nose: Nose normal.   Mouth/Throat: Mucous membranes are dry.   cortrak in place    Nasal mask with oxygen 6 L   Eyes: Pupils are equal, round, and reactive to light. EOM are normal. Right eye exhibits no discharge. Left eye exhibits no discharge. No scleral icterus.   Neck: Neck supple. No JVD present. No thyromegaly present.   Cardiovascular: Normal rate and intact distal pulses.   No murmur heard.  Pulmonary/Chest: Effort normal and breath sounds normal. No respiratory distress. He has no wheezes.   Abdominal: Soft. Bowel sounds are normal. He exhibits no distension. There is no tenderness.    Musculoskeletal: He exhibits no edema or tenderness.   Neurological: He is alert. No cranial nerve deficit. Coordination normal.   Not answering orientation questions   Skin: Skin is warm and dry. No rash noted. He is not diaphoretic. No erythema. No pallor.   Psychiatric: He has a normal mood and affect. Cognition and memory are impaired.   Nursing note and vitals reviewed.  I examined the patient on 8/28.  No significant changes from 8/27 except as documented    Fluids    Intake/Output Summary (Last 24 hours) at 8/28/2019 1632  Last data filed at 8/28/2019 0405  Gross per 24 hour   Intake 250 ml   Output --   Net 250 ml       Laboratory  Recent Labs     08/26/19  0236 08/27/19  0236   WBC 15.6* 9.7   RBC 4.60* 4.21*   HEMOGLOBIN 15.1 14.2   HEMATOCRIT 46.9 43.1   .0* 102.4*   MCH 32.8 33.7*   MCHC 32.2* 32.9*   RDW 53.9* 52.8*   PLATELETCT 160* 144*   MPV 9.8 9.9     Recent Labs     08/26/19  0236 08/27/19  0236   SODIUM 139 141   POTASSIUM 4.0 3.8   CHLORIDE 105 106   CO2 28 32   GLUCOSE 121* 134*   BUN 19 18   CREATININE 1.08 0.98   CALCIUM 8.9 8.9                   Imaging  DX-ESOPHAGUS - [id number]   Final Result      Please refer to dedicated speech pathology report for complete details.      DX-CHEST-LIMITED (1 VIEW)   Final Result      Unchanged perihilar and bibasilar atelectasis with suspected superimposed pulmonary edema      DX-ABDOMEN FOR TUBE PLACEMENT   Final Result      Stable enteric tube position, tip overlying the gastric antrum      DX-ABDOMEN FOR TUBE PLACEMENT   Final Result      Feeding tube in place as noted above.      MR-YHVIBHR-0 VIEW   Final Result      Enteric tube projects over the stomach.      DX-CHEST-PORTABLE (1 VIEW)   Final Result      Bilateral opacities as noted above, consistent with pulmonary edema.           Assessment/Plan  * Aspiration pneumonia (HCC)  Assessment & Plan  8/26 aspiration event on evening prior to admission.    -Blood cultures pending  Continues  Unasyn  Oxygen supplementation as needed  Ongoing leukocytosis  Positive procalcitonin of 2.3  8/27 blood culture from 8/25 negative for 48 hours  Respiratory status stable on 6 L oxygen mask  White blood cells went down to 9.7  8/28.  Repeat chest x-ray unchanged, on 6 L.  Need to wean down oxygen to 4 L based      Severe protein-calorie malnutrition (HCC)  Assessment & Plan  Pre-albumin of 17  Continue nutritional support per dietitian recommendations    Dysphasia  Assessment & Plan  Seen by speech therapy  Requiring core tract for tube feeding  8/28 started on dysphagia diet.  We will keep core track until tomorrow to ensure adequate oral intake    Overweight (BMI 25.0-29.9)  Assessment & Plan  Body mass index is 25.87 kg/m².      Down's syndrome  Assessment & Plan  Non-verbal at baseline.  Will benefit from sister being at bedside if this can be accommodated.      8/26 pulling at cortrak, restraints prn    No history of dysphagia prior  Will need continued speech therapy    Acute on chronic respiratory failure (HCC)  Assessment & Plan  Typically on 3-4L oxygen at home at baseline.  Now requiring face mask.    Titrate down as tolerated.  Otherwise do home O2 evaluation for new oxygen requirement       VTE prophylaxis: lovenox

## 2019-08-29 ENCOUNTER — PATIENT OUTREACH (OUTPATIENT)
Dept: HEALTH INFORMATION MANAGEMENT | Facility: OTHER | Age: 49
End: 2019-08-29

## 2019-08-29 VITALS
OXYGEN SATURATION: 92 % | BODY MASS INDEX: 26.58 KG/M2 | WEIGHT: 135.36 LBS | RESPIRATION RATE: 18 BRPM | HEIGHT: 60 IN | HEART RATE: 57 BPM | DIASTOLIC BLOOD PRESSURE: 78 MMHG | SYSTOLIC BLOOD PRESSURE: 121 MMHG | TEMPERATURE: 98 F

## 2019-08-29 PROCEDURE — A9270 NON-COVERED ITEM OR SERVICE: HCPCS | Performed by: HOSPITALIST

## 2019-08-29 PROCEDURE — 92526 ORAL FUNCTION THERAPY: CPT

## 2019-08-29 PROCEDURE — 700111 HCHG RX REV CODE 636 W/ 250 OVERRIDE (IP): Performed by: HOSPITALIST

## 2019-08-29 PROCEDURE — 700102 HCHG RX REV CODE 250 W/ 637 OVERRIDE(OP): Performed by: INTERNAL MEDICINE

## 2019-08-29 PROCEDURE — 99239 HOSP IP/OBS DSCHRG MGMT >30: CPT | Performed by: INTERNAL MEDICINE

## 2019-08-29 PROCEDURE — A9270 NON-COVERED ITEM OR SERVICE: HCPCS | Performed by: INTERNAL MEDICINE

## 2019-08-29 PROCEDURE — 700102 HCHG RX REV CODE 250 W/ 637 OVERRIDE(OP): Performed by: HOSPITALIST

## 2019-08-29 RX ADMIN — ENOXAPARIN SODIUM 40 MG: 100 INJECTION SUBCUTANEOUS at 05:54

## 2019-08-29 RX ADMIN — ACETAMINOPHEN 650 MG: 325 TABLET, FILM COATED ORAL at 04:33

## 2019-08-29 RX ADMIN — SERTRALINE HYDROCHLORIDE 150 MG: 50 TABLET ORAL at 05:54

## 2019-08-29 RX ADMIN — LIOTHYRONINE SODIUM 25 MCG: 25 TABLET ORAL at 10:36

## 2019-08-29 RX ADMIN — ACETAMINOPHEN 650 MG: 325 TABLET, FILM COATED ORAL at 10:36

## 2019-08-29 RX ADMIN — LEVOTHYROXINE SODIUM 150 MCG: 75 TABLET ORAL at 05:54

## 2019-08-29 NOTE — PROGRESS NOTES
Pt discharged home with family. IV removed, tele monitoring removed. Denies pain at time of d/c. VSS. No s/s of distress. AVS reviewed with family. Belongings sent with patient, family brought in home O2 tank to use during d/c /transport.

## 2019-08-29 NOTE — DISCHARGE PLANNING
Anticipated Discharge Disposition: Home with family and [facility] services    Action: Discussed pt's case in MDT rounds, pt currently with a cortrack and SLP following closely and PT/OT evals completed. Plan is to see if cortrack can be d/c as pt progresses. LSW met with pt and his sister Elizabeth #[phone] at bedside and completed assessment and discussed d/c planning. Pt's sister was very attentive to pt's needs throughout conversation. Pt is a 49 y.o. Man with Down Syndrome who lives with his sister, father and other family members. Pt has 24/7 supervision at home, was ambulatory, has home 02 through [facility] 4-5L 02 baseline. Family assists with medication, meals and transportation. Discussed [facility] services and provided sister with choice form. Sister would like referral sent to [facility] as they have used them in the past for another family member. LSW faxed [facility] choice form to [facility] to process referral.     Barriers to Discharge: Pending cortrack removal and acceptance to [facility].     Plan: As above, pending acceptance to [facility]. Plan is to see if cortrack can be removed as pt progresses. Pt has 24/7 assistance at home.       Care Transition Team Assessment    Information Source  Orientation : Disoriented to Place, Disoriented to Event, Disoriented to Time(baseline per family)  Information Given By: Relative(sister)  Informant's Name: sister Elizabeth         Elopement Risk  Legal Hold: No  Ambulatory or Self Mobile in Wheelchair: Yes  Disoriented: Time-At Risk for Elopement, Situation-At Risk for Elopement, Place-At Risk for Elopement  Psychiatric Symptoms: Impulsivity-at Risk for Elopement  History of Wandering: No  Elopement this Admit: No  Vocalizing Wanting to Leave: No  Displays Behaviors, Body Language Wanting to Leave: No-Not at Risk for Elopement  Elopement Risk: Not at Risk for Elopement    Interdisciplinary Discharge Planning  Primary Care Physician: Dr. Arian Haney  Lives with - Patient's Self Care  Capacity: Parents, Other (Comments)(sister and brother)  Patient or legal guardian wants to designate a caregiver (see row info): Yes  ([facility] Authorization for Release of Health Information has been completed: Yes  Housing / Facility: Mobile Home  Prior Services: Continuous (24 Hour) Care Giving Family  Durable Medical Equipment: Home Oxygen  DME Provider / Phone: Juan David    Discharge Preparedness  What is your plan after discharge?: Home health care, Uncertain - pending medical team collaboration  What are your discharge supports?: Parent, Sibling  Prior Functional Level: Ambulatory, Needs Assist with Activities of Daily Living, Needs Assist with Medication Management  Difficulity with ADLs: None  Difficulity with IADLs: Cooking, Driving, Keeping track of finances, Laundry, Managing medication  Difficulity with IADL Comments: family assists    Functional Assesment  Prior Functional Level: Ambulatory, Needs Assist with Activities of Daily Living, Needs Assist with Medication Management    Finances  Financial Barriers to Discharge: No  Prescription Coverage: Yes    Vision / Hearing Impairment  Vision Impairment : Yes  Right Eye Vision: Wears Glasses  Left Eye Vision: Wears Glasses              Domestic Abuse  Have you ever been the victim of abuse or violence?: Not Sure  Physical Abuse or Sexual Abuse: Unable to Assess due to Patient Condition  Verbal Abuse or Emotional Abuse: Unable to Assess due to Patient Condition  Possible Abuse Reported to:: Not Applicable         Discharge Risks or Barriers  Discharge risks or barriers?: Complex medical needs  Patient risk factors: Complex medical needs    Anticipated Discharge Information  Anticipated discharge disposition: Home, [facility]  Discharge Address: [address] [place] [address]  Discharge Contact Phone Number: [phone]

## 2019-08-29 NOTE — THERAPY
"Speech Language Therapy dysphagia treatment completed.   Functional Status:  Pt seen during breakfast meal of Dysphagia 1/thin liquid textures. Pt was on 5L O2 via oxymask (baseline O2), switched to NC for meal with O2 sats ranging from 91-95%. Pt's sister/caregiver Elizabeth was present and participated in caregiver training/ed for feeding patient with safe swallow precautions. With minimal verbal cues and SLP demonstration, pt's sister fed him small bites from teaspoon and provided single straw sips of thin liquids while pt seated upright in bed, alternating solids and liquids as instructed. Pt did present with increased WOB intermittently and CG needed min verbal cues to slow rate of feeding so breathing could return to baseline before eating/drinking more. After finishing his meal, while CG was providing oral care, prolonged coughing occurred. Unclear if related to acute aspiration event (?residue, ?secretions) or pneumonia as sister reports he was coughing like this earlier without any PO. No s/sx of aspiration occurred during breakfast meal. Recommend continuing Dysphagia 1/thins with 1:1 feeding A. Ok to pull Cortrak from a swallowing standpoint. Handout on Dysphagia 1/thin liquid diet provided to pt's sister and discussed in detail. Family is in agreement with continued SLP services in the home.     Recommendations: Dysphagia 1/thins with 1:1 feeding A. Ok to pull Cortrak from a swallowing standpoint.   Plan of Care: Will benefit from Speech Therapy 3 times per week  Post-Acute Therapy: Recommend home health transitional care services for continued speech therapy services      See \"Rehab Therapy-Acute\" Patient Summary Report for complete documentation.     "

## 2019-08-29 NOTE — DISCHARGE INSTRUCTIONS
Aspiration Pneumonia  Aspiration pneumonia is an infection in your lungs. It occurs when food, liquid, or stomach contents (vomit) are inhaled (aspirated) into your lungs. When these things get into your lungs, swelling (inflammation) and infection can occur. This can make it difficult for you to breathe. Aspiration pneumonia is a serious condition and can be life threatening.  What increases the risk?  Aspiration pneumonia is more likely to occur when a person's cough (gag) reflex or ability to swallow has been decreased. Some things that can do this include:  · Having a brain injury or disease, such as stroke, seizures, Parkinson's disease, dementia, or amyotrophic lateral sclerosis (ALS).  · Being given general anesthetic for procedures.  · Being in a coma (unconscious).  · Having a narrowing of the tube that carries food to the stomach (esophagus).  · Drinking too much alcohol. If a person passes out and vomits, vomit can be swallowed into the lungs.  · Taking certain medicines, such as tranquilizers or sedatives.  What are the signs or symptoms?  · Coughing after swallowing food or liquids.  · Breathing problems, such as wheezing or shortness of breath.  · Bluish skin. This can be caused by lack of oxygen.  · Coughing up food or mucus. The mucus might contain blood, greenish material, or yellowish-white fluid (pus).  · Fever.  · Chest pain.  · Being more tired than usual (fatigue).  · Sweating more than usual.  · Bad breath.  How is this diagnosed?  A physical exam will be done. During the exam, the health care provider will listen to your lungs with a stethoscope to check for:  · Crackling sounds in the lungs.  · Decreased breath sounds.  · A rapid heartbeat.  Various tests may be ordered. These may include:  · Chest X-ray.  · CT scan.  · Swallowing study. This test looks at how food is swallowed and whether it goes into your breathing tube (trachea) or food pipe (esophagus).  · Sputum culture. Saliva and  mucus (sputum) are collected from the lungs or the tubes that carry air to the lungs (bronchi). The sputum is then tested for bacteria.  · Bronchoscopy. This test uses a flexible tube (bronchoscope) to see inside the lungs.  How is this treated?  Treatment will usually include antibiotic medicines. Other medicines may also be used to reduce fever or pain. You may need to be treated in the hospital. In the hospital, your breathing will be carefully monitored. Depending on how well you are breathing, you may need to be given oxygen, or you may need breathing support from a breathing machine (ventilator). For people who fail a swallowing study, a feeding tube might be placed in the stomach, or they may be asked to avoid certain food textures or liquids when they eat.  Follow these instructions at home:  · Carefully follow any special eating instructions you were given, such as avoiding certain food textures or thickening liquids. This reduces the risk of developing aspiration pneumonia again.  · Only take over-the-counter or prescription medicines as directed by your health care provider. Follow the directions carefully.  · If you were prescribed antibiotics, take them as directed. Finish them even if you start to feel better.  · Rest as instructed by your health care provider.  · Keep all follow-up appointments with your health care provider.  Contact a health care provider if:  · You develop worsening shortness of breath, wheezing, or difficulty breathing.  · You develop a fever.  · You have chest pain.  This information is not intended to replace advice given to you by your health care provider. Make sure you discuss any questions you have with your health care provider.  Document Released: 10/15/2010 Document Revised: 05/31/2017 Document Reviewed: 06/05/2014  Elsevier Interactive Patient Education © 2017 [employer]          Discharge Instructions    Discharged to home by car with relative. Discharged via  wheelchair, hospital escort: Yes.  Special equipment needed: Oxygen    Be sure to schedule a follow-up appointment with your primary care doctor or any specialists as instructed.     Discharge Plan:   Influenza Vaccine Indication: Indicated: Not available from distributor/    I understand that a diet low in cholesterol, fat, and sodium is recommended for good health. Unless I have been given specific instructions below for another diet, I accept this instruction as my diet prescription.   Other diet: Pureed with thin liquids      Special Instructions: None    · Is patient discharged on Warfarin / Coumadin?   No     Depression / Suicide Risk    As you are discharged from this [facility] facility, it is important to learn how to keep safe from harming yourself.    Recognize the warning signs:  · Abrupt changes in personality, positive or negative- including increase in energy   · Giving away possessions  · Change in eating patterns- significant weight changes-  positive or negative  · Change in sleeping patterns- unable to sleep or sleeping all the time   · Unwillingness or inability to communicate  · Depression  · Unusual sadness, discouragement and loneliness  · Talk of wanting to die  · Neglect of personal appearance   · Rebelliousness- reckless behavior  · Withdrawal from people/activities they love  · Confusion- inability to concentrate     If you or a loved one observes any of these behaviors or has concerns about self-harm, here's what you can do:  · Talk about it- your feelings and reasons for harming yourself  · Remove any means that you might use to hurt yourself (examples: pills, rope, extension cords, firearm)  · Get professional help from the community (Mental Health, Substance Abuse, psychological counseling)  · Do not be alone:Call your Safe Contact- someone whom you trust who will be there for you.  · Call your local CRISIS HOTLINE 033-2468 or 395-172-9498  · Call your local Children's  Mobile Crisis Response Team [place] (495) 639-0394 or www.Smartisan.CINEPASS  · Call the toll free National Suicide Prevention Hotlines   · National Suicide Prevention Lifeline 594-404-LFYG (5738)  · National Hope Line Network 800-SUICIDE (968-8544)

## 2019-08-29 NOTE — CARE PLAN
Problem: Communication  Goal: The ability to communicate needs accurately and effectively will improve  Outcome: PROGRESSING AS EXPECTED  Patient educated to utilize call light. Patient and family oriented to hospital room. Patient encouraged to ask questions about plan of care. Patient effectively uses call light and is involved in POC.       Problem: Safety  Goal: Will remain free from injury  Outcome: PROGRESSING AS EXPECTED  Patient's risk for injury and falls assessed. Appropriate safety precautions in place. Patient educated to utilize call light for needs. Patient verbalizes understanding.

## 2019-08-29 NOTE — DISCHARGE PLANNING
Received Choice form at 0800  Agency/Facility Name: [facility]  Referral sent per Choice form @ 0808     @5900  Agency/Facility Name: [facility]  Spoke To: Mehreen  Outcome: Accepted

## 2019-08-29 NOTE — PROGRESS NOTES
Report received from day shift nurse. Assessment complete. Patient A&Ox1. Patient disoriented to time, event, and place. Patient able to say his name and birthday. Patient denies any discomfort at this time. Wrist restraints in place for patient pulling at cortrak. Checked patient's circulation - no signs of injury and performed range of motion. Family at bedside. Call light and belongings within reach. Bed in low position and treaded slippers on patient. Bed alarm on and functioning.  Patient resting comfortably in bed. Will continue to monitor hourly.

## 2019-08-29 NOTE — DIETARY
Nutrition Services: Brief Update    Pt previously on tube feedings.  Swallow evaluation and modified barium swallow study by SLP 8/28.  Diet advanced to Dysphagia 1, Thin liquids.  Cortrak has been removed and tube feedings are off.  Per ADL flow sheet, PO % for one documented meal thus far.  Spoke with mother and pt at bedside. Mother reports pt has a good appetite, as pt is non-verbal.    Recommendations/Plan:  1. Advance diet as tolerated per SLP.  2. Encourage intake of meals.  3. Document intake of all meals as % taken in ADLs to provide interdisciplinary communication across all shifts.   4. Monitor weight.  5. Nutrition rep will continue to see patient for ongoing meal and snack preferences.     RD will continue to follow.

## 2019-08-29 NOTE — CARE PLAN
Problem: Nutritional:  Goal: Achieve adequate nutritional intake  Description  Patient will consume 50% of meals   Outcome: PROGRESSING AS EXPECTED    Diet advanced to Dysphagia 1, thin liquid.  PO % for one meal thus far.

## 2019-08-30 LAB
BACTERIA BLD CULT: NORMAL
BACTERIA BLD CULT: NORMAL
SIGNIFICANT IND 70042: NORMAL
SIGNIFICANT IND 70042: NORMAL
SITE SITE: NORMAL
SITE SITE: NORMAL
SOURCE SOURCE: NORMAL
SOURCE SOURCE: NORMAL

## 2019-08-30 NOTE — DISCHARGE SUMMARY
Discharge Summary    CHIEF COMPLAINT ON ADMISSION  Chief Complaint   Patient presents with   • Difficulty Breathing   • Difficulty Swallowing       Reason for Admission  Shortness Of Breath     Admission Date  8/24/2019    CODE STATUS  Prior    HPI & HOSPITAL COURSE    49 y.o. Male with history of  Down syndrome, chronic respiratory failure with hypoxia,  admitted 8/24/2019 with complaints of shortness of breath and cough.  Presents found to have acute on chronic respiratory failure secondary to aspiration pneumonia.  He was treated with IV antibiotics.  Speech pathologist evaluated the patient and recommended n.p.o. status and tube feeding.  Patient provided with tube feeding.  Eventually patient was started on dysphagia diet and tolerated it well.  Tube feeding stopped.  Hypoxia improved.  Cough resolved.  It was recommended patient to get speech therapy at inpatient nursing home, however family refused any placement.  Subsequently home health was set up for the patient and he was discharged in stable condition.        Therefore, he is discharged in fair and stable condition to home with close outpatient follow-up.    The patient met 2-midnight criteria for an inpatient stay at the time of discharge.    Discharge Date  8/29/2019    FOLLOW UP ITEMS POST DISCHARGE  PCP    DISCHARGE DIAGNOSES  Principal Problem:    Aspiration pneumonia (HCC) POA: Unknown  Active Problems:    Acute on chronic respiratory failure (HCC) POA: Unknown    Down's syndrome POA: Unknown    Overweight (BMI 25.0-29.9) POA: Unknown    Dysphasia POA: Unknown    Severe protein-calorie malnutrition (HCC) POA: Unknown  Resolved Problems:    * No resolved hospital problems. *      FOLLOW UP  No future appointments.  Arian Haney M.D.  [address] [address]  [phone]      [facility]  called office and left a voicemail requesting office to call to schedule your appointment . If you do not hear from them please call to  schedule your appointment. Thank you      MEDICATIONS ON DISCHARGE     Medication List      CONTINUE taking these medications      Instructions   levothyroxine 150 MCG Tabs  Commonly known as:  SYNTHROID   Take 150 mcg by mouth Every morning on an empty stomach.  Dose:  150 mcg     liothyronine 25 MCG Tabs  Commonly known as:  CYTOMEL   Take 25 mcg by mouth every day.  Dose:  25 mcg     SIMVASTATIN PO   Take 40 mg by mouth every bedtime.  Dose:  40 mg     ZOLOFT PO   Take 150 mg by mouth every day.  Dose:  150 mg            Allergies  No Known Allergies    DIET  No orders of the defined types were placed in this encounter.      ACTIVITY  As tolerated.  Weight bearing as tolerated    CONSULTATIONS  None    PROCEDURES  None    LABORATORY  Lab Results   Component Value Date    SODIUM 141 08/27/2019    POTASSIUM 3.8 08/27/2019    CHLORIDE 106 08/27/2019    CO2 32 08/27/2019    GLUCOSE 134 (H) 08/27/2019    BUN 18 08/27/2019    CREATININE 0.98 08/27/2019        Lab Results   Component Value Date    WBC 9.7 08/27/2019    HEMOGLOBIN 14.2 08/27/2019    HEMATOCRIT 43.1 08/27/2019    PLATELETCT 144 (L) 08/27/2019      DX-ESOPHAGUS - [id number]   Final Result      Please refer to dedicated speech pathology report for complete details.      DX-CHEST-LIMITED (1 VIEW)   Final Result      Unchanged perihilar and bibasilar atelectasis with suspected superimposed pulmonary edema      DX-ABDOMEN FOR TUBE PLACEMENT   Final Result      Stable enteric tube position, tip overlying the gastric antrum      DX-ABDOMEN FOR TUBE PLACEMENT   Final Result      Feeding tube in place as noted above.      [id number] VIEW   Final Result      Enteric tube projects over the stomach.      DX-CHEST-PORTABLE (1 VIEW)   Final Result      Bilateral opacities as noted above, consistent with pulmonary edema.            Total time of the discharge process exceeds 47 minutes.

## 2020-11-27 ENCOUNTER — HOSPITAL ENCOUNTER (OUTPATIENT)
Facility: MEDICAL CENTER | Age: 50
End: 2020-11-27
Payer: COMMERCIAL

## 2020-11-27 LAB
COVID ORDER STATUS COVID19: NORMAL
SARS-COV-2 RNA RESP QL NAA+PROBE: DETECTED
SPECIMEN SOURCE: ABNORMAL

## 2021-09-20 ENCOUNTER — HOSPITAL ENCOUNTER (OUTPATIENT)
Dept: LAB | Facility: MEDICAL CENTER | Age: 51
End: 2021-09-20
Attending: INTERNAL MEDICINE
Payer: MEDICARE

## 2021-09-20 LAB
ALBUMIN SERPL BCP-MCNC: 4.1 G/DL (ref 3.2–4.9)
ALBUMIN/GLOB SERPL: 1.1 G/DL
ALP SERPL-CCNC: 63 U/L (ref 30–99)
ALT SERPL-CCNC: 16 U/L (ref 2–50)
ANION GAP SERPL CALC-SCNC: 14 MMOL/L (ref 7–16)
AST SERPL-CCNC: 22 U/L (ref 12–45)
BILIRUB SERPL-MCNC: 0.4 MG/DL (ref 0.1–1.5)
BUN SERPL-MCNC: 20 MG/DL (ref 8–22)
CALCIUM SERPL-MCNC: 9.7 MG/DL (ref 8.5–10.5)
CHLORIDE SERPL-SCNC: 101 MMOL/L (ref 96–112)
CHOLEST SERPL-MCNC: 276 MG/DL (ref 100–199)
CO2 SERPL-SCNC: 25 MMOL/L (ref 20–33)
CREAT SERPL-MCNC: 1.08 MG/DL (ref 0.5–1.4)
GLOBULIN SER CALC-MCNC: 3.7 G/DL (ref 1.9–3.5)
GLUCOSE SERPL-MCNC: 113 MG/DL (ref 65–99)
HDLC SERPL-MCNC: 53 MG/DL
LDLC SERPL CALC-MCNC: 196 MG/DL
POTASSIUM SERPL-SCNC: 4 MMOL/L (ref 3.6–5.5)
PROT SERPL-MCNC: 7.8 G/DL (ref 6–8.2)
SODIUM SERPL-SCNC: 140 MMOL/L (ref 135–145)
TRIGL SERPL-MCNC: 134 MG/DL (ref 0–149)
TSH SERPL DL<=0.005 MIU/L-ACNC: 0.13 UIU/ML (ref 0.38–5.33)

## 2021-09-20 PROCEDURE — 84443 ASSAY THYROID STIM HORMONE: CPT

## 2021-09-20 PROCEDURE — 80053 COMPREHEN METABOLIC PANEL: CPT

## 2021-09-20 PROCEDURE — 36415 COLL VENOUS BLD VENIPUNCTURE: CPT

## 2021-09-20 PROCEDURE — 80061 LIPID PANEL: CPT

## 2021-10-08 ENCOUNTER — HOSPITAL ENCOUNTER (EMERGENCY)
Facility: MEDICAL CENTER | Age: 51
End: 2021-10-08
Attending: EMERGENCY MEDICINE
Payer: MEDICARE

## 2021-10-08 ENCOUNTER — APPOINTMENT (OUTPATIENT)
Dept: RADIOLOGY | Facility: MEDICAL CENTER | Age: 51
End: 2021-10-08
Attending: EMERGENCY MEDICINE
Payer: MEDICARE

## 2021-10-08 VITALS
WEIGHT: 125 LBS | HEIGHT: 62 IN | HEART RATE: 79 BPM | OXYGEN SATURATION: 96 % | BODY MASS INDEX: 23 KG/M2 | TEMPERATURE: 97.8 F | RESPIRATION RATE: 23 BRPM | DIASTOLIC BLOOD PRESSURE: 55 MMHG | SYSTOLIC BLOOD PRESSURE: 95 MMHG

## 2021-10-08 DIAGNOSIS — R13.10 DIFFICULTY SWALLOWING SOLIDS: ICD-10-CM

## 2021-10-08 DIAGNOSIS — K40.90 RIGHT INGUINAL HERNIA: ICD-10-CM

## 2021-10-08 LAB
ALBUMIN SERPL BCP-MCNC: 3.1 G/DL (ref 3.2–4.9)
ALBUMIN/GLOB SERPL: 1 G/DL
ALP SERPL-CCNC: 47 U/L (ref 30–99)
ALT SERPL-CCNC: 16 U/L (ref 2–50)
ANION GAP SERPL CALC-SCNC: 9 MMOL/L (ref 7–16)
AST SERPL-CCNC: 13 U/L (ref 12–45)
BASOPHILS # BLD AUTO: 0.4 % (ref 0–1.8)
BASOPHILS # BLD: 0.04 K/UL (ref 0–0.12)
BILIRUB SERPL-MCNC: 0.2 MG/DL (ref 0.1–1.5)
BUN SERPL-MCNC: 17 MG/DL (ref 8–22)
CALCIUM SERPL-MCNC: 8.4 MG/DL (ref 8.5–10.5)
CHLORIDE SERPL-SCNC: 100 MMOL/L (ref 96–112)
CO2 SERPL-SCNC: 24 MMOL/L (ref 20–33)
CREAT SERPL-MCNC: 0.83 MG/DL (ref 0.5–1.4)
EOSINOPHIL # BLD AUTO: 0.03 K/UL (ref 0–0.51)
EOSINOPHIL NFR BLD: 0.3 % (ref 0–6.9)
ERYTHROCYTE [DISTWIDTH] IN BLOOD BY AUTOMATED COUNT: 52.2 FL (ref 35.9–50)
GLOBULIN SER CALC-MCNC: 3.1 G/DL (ref 1.9–3.5)
GLUCOSE SERPL-MCNC: 135 MG/DL (ref 65–99)
HCT VFR BLD AUTO: 37.2 % (ref 42–52)
HGB BLD-MCNC: 12.8 G/DL (ref 14–18)
IMM GRANULOCYTES # BLD AUTO: 0.11 K/UL (ref 0–0.11)
IMM GRANULOCYTES NFR BLD AUTO: 1 % (ref 0–0.9)
LACTATE BLD-SCNC: 1.8 MMOL/L (ref 0.5–2)
LIPASE SERPL-CCNC: 16 U/L (ref 11–82)
LYMPHOCYTES # BLD AUTO: 1.19 K/UL (ref 1–4.8)
LYMPHOCYTES NFR BLD: 10.7 % (ref 22–41)
MAGNESIUM SERPL-MCNC: 2 MG/DL (ref 1.5–2.5)
MCH RBC QN AUTO: 33.3 PG (ref 27–33)
MCHC RBC AUTO-ENTMCNC: 34.4 G/DL (ref 33.7–35.3)
MCV RBC AUTO: 96.9 FL (ref 81.4–97.8)
MONOCYTES # BLD AUTO: 0.73 K/UL (ref 0–0.85)
MONOCYTES NFR BLD AUTO: 6.6 % (ref 0–13.4)
NEUTROPHILS # BLD AUTO: 9.04 K/UL (ref 1.82–7.42)
NEUTROPHILS NFR BLD: 81 % (ref 44–72)
NRBC # BLD AUTO: 0 K/UL
NRBC BLD-RTO: 0 /100 WBC
PHOSPHATE SERPL-MCNC: 2.5 MG/DL (ref 2.5–4.5)
PLATELET # BLD AUTO: 200 K/UL (ref 164–446)
PMV BLD AUTO: 8.8 FL (ref 9–12.9)
POTASSIUM SERPL-SCNC: 4 MMOL/L (ref 3.6–5.5)
PROT SERPL-MCNC: 6.2 G/DL (ref 6–8.2)
RBC # BLD AUTO: 3.84 M/UL (ref 4.7–6.1)
SODIUM SERPL-SCNC: 133 MMOL/L (ref 135–145)
WBC # BLD AUTO: 11.1 K/UL (ref 4.8–10.8)

## 2021-10-08 PROCEDURE — 84100 ASSAY OF PHOSPHORUS: CPT

## 2021-10-08 PROCEDURE — 71045 X-RAY EXAM CHEST 1 VIEW: CPT

## 2021-10-08 PROCEDURE — 85025 COMPLETE CBC W/AUTO DIFF WBC: CPT

## 2021-10-08 PROCEDURE — 700105 HCHG RX REV CODE 258: Performed by: EMERGENCY MEDICINE

## 2021-10-08 PROCEDURE — 83605 ASSAY OF LACTIC ACID: CPT

## 2021-10-08 PROCEDURE — 83735 ASSAY OF MAGNESIUM: CPT

## 2021-10-08 PROCEDURE — 99285 EMERGENCY DEPT VISIT HI MDM: CPT

## 2021-10-08 PROCEDURE — 80053 COMPREHEN METABOLIC PANEL: CPT

## 2021-10-08 PROCEDURE — 87040 BLOOD CULTURE FOR BACTERIA: CPT | Mod: 91

## 2021-10-08 PROCEDURE — 83690 ASSAY OF LIPASE: CPT

## 2021-10-08 RX ORDER — ONDANSETRON 2 MG/ML
4 INJECTION INTRAMUSCULAR; INTRAVENOUS ONCE
Status: DISCONTINUED | OUTPATIENT
Start: 2021-10-08 | End: 2021-10-09 | Stop reason: HOSPADM

## 2021-10-08 RX ORDER — SODIUM CHLORIDE, SODIUM LACTATE, POTASSIUM CHLORIDE, AND CALCIUM CHLORIDE .6; .31; .03; .02 G/100ML; G/100ML; G/100ML; G/100ML
30 INJECTION, SOLUTION INTRAVENOUS ONCE
Status: COMPLETED | OUTPATIENT
Start: 2021-10-08 | End: 2021-10-08

## 2021-10-08 RX ADMIN — SODIUM CHLORIDE, POTASSIUM CHLORIDE, SODIUM LACTATE AND CALCIUM CHLORIDE 1701 ML: 600; 310; 30; 20 INJECTION, SOLUTION INTRAVENOUS at 20:59

## 2021-10-08 ASSESSMENT — PAIN DESCRIPTION - PAIN TYPE: TYPE: ACUTE PAIN

## 2021-10-08 ASSESSMENT — LIFESTYLE VARIABLES
EVER HAD A DRINK FIRST THING IN THE MORNING TO STEADY YOUR NERVES TO GET RID OF A HANGOVER: NO
DO YOU DRINK ALCOHOL: NO
HAVE PEOPLE ANNOYED YOU BY CRITICIZING YOUR DRINKING: NO
TOTAL SCORE: 0
CONSUMPTION TOTAL: INCOMPLETE
TOTAL SCORE: 0
TOTAL SCORE: 0
EVER FELT BAD OR GUILTY ABOUT YOUR DRINKING: NO
HAVE YOU EVER FELT YOU SHOULD CUT DOWN ON YOUR DRINKING: NO
DOES PATIENT WANT TO STOP DRINKING: NO

## 2021-10-09 NOTE — ED PROVIDER NOTES
ED Provider Note    CHIEF COMPLAINT  Chief Complaint   Patient presents with   • Hernia     right inguinal protruding hernia for 3 weeks    • Nausea/Vomiting/Diarrhea     x 2 days    • Other     pt combative with EMS not wanting to come to the ER- sister POA called and insisted. 5mg versed IN given en route        HPI  Arian Ramsey is a 51 y.o. male who presents to the emergency department for complaints of diarrhea difficulty swallowing and a right inguinal hernia.  The patient has a history of Down syndrome and his sister is one of his primary care taker's.  She states that he has follow-up with hospitals for his right inguinal hernia but she feels that she has been having difficulty putting it back in today.  She states at dinner tonight he seemed to be kind of choking on his food and has had diarrhea for a few days.  No fevers but she wanted to get them checked out.  She states that she had been gone for a few months because she got  but just came back a few weeks ago and has been slowly taking back over his responsibilities from the rest of the family.    She does state that his blood pressure is normally in the 90s to low 100s    History is limited because it is given by the sister    Unfortunately he was combative by EMS and did not want to come thus they had to give him some Versed and he is a bit sedated at this time    REVIEW OF SYSTEMS  Positives as above. Pertinent negatives include fevers cough congestion bloody stools bloody emesis complaints of painful urination  All other review of systems are negative    PAST MEDICAL HISTORY   has a past medical history of Down's syndrome, Heart murmur, High cholesterol, and Hypothyroidism.    SOCIAL HISTORY  Social History     Tobacco Use   • Smoking status: Never Smoker   • Smokeless tobacco: Never Used   Vaping Use   • Vaping Use: Never used   Substance and Sexual Activity   • Alcohol use: Never   • Drug use: Never   • Sexual activity:  "Not on file       SURGICAL HISTORY   has a past surgical history that includes hernia repair.    CURRENT MEDICATIONS  Home Medications    **Home medications have not yet been reviewed for this encounter**         ALLERGIES  No Known Allergies    PHYSICAL EXAM  VITAL SIGNS: BP (!) 87/51   Pulse 84   Temp 36.6 °C (97.8 °F) (Temporal)   Resp 18   Ht 1.575 m (5' 2\")   Wt 56.7 kg (125 lb)   SpO2 95%   BMI 22.86 kg/m²    Pulse ox interpretation: I interpret this pulse ox as normal on his home oxygen.  Constitutional: Sleepy but opens his eyes to voice  HENT: Normocephalic atraumatic, MMM  Eyes: PER, Conjunctiva normal, Non-icteric.   Neck: Normal range of motion, No tenderness, Supple, No stridor.   Cardiovascular: Regular rate and rhythm, no murmurs.   Thorax & Lungs: Coarse breath sounds bilateral bases, No respiratory distress, No wheezing, No chest tenderness.   Abdomen: Bowel sounds normal, Soft, No tenderness, No pulsatile masses. No peritoneal signs.  Large right inguinal hernia easily reduced  Skin: Warm, Dry, No erythema, No rash.   Back: No bony tenderness, No CVA tenderness.   Extremities/MSK: Intact equal distal pulses, No edema, No tenderness, No cyanosis, no major deformities noted  Neurologic: Alert and oriented x3, No focal deficits noted.       DIFFERENTIAL DIAGNOSIS AND WORK UP PLAN    This is a 51 y.o. male who presents with complaints of vomiting diarrhea he had a right inguinal hernia on my examination which was very easily reduced he did not seem to have any discomfort his abdomen was soft.  Patient sister thinks that it may have been stuck all day although it did not show signs of incarceration thus with his vomiting and diarrhea will be performing a CT scan of the abdomen pelvis he has little bit of a coarse cough but fortunately is not using any more oxygen than his baseline.  Due to his borderline hypotension even though she states is baseline he received IV fluids    DIAGNOSTIC STUDIES / " PROCEDURES    EKG  Results for orders placed or performed during the hospital encounter of 19   EKG   Result Value Ref Range    Report       [facility] Emergency Dept.    Test Date:  2019  Pt Name:    ILA RICH            Department: ER  MRN:        [id number]                      Room:  Gender:     Male                         Technician: [id number]  :        1970                   Requested By:ER TRIAGE PROTOCOL  Order #:    [id number]                    Reading MD: KUMAR LINN MD    Measurements  Intervals                                Axis  Rate:       111                          P:          69  LA:         184                          QRS:        266  QRSD:       108                          T:          68  QT:         344  QTc:        468    Interpretive Statements  Twelve-lead EKG shows a sinus tachycardia with a ventricular rate of 111, no  ST  segment elevation or depression, normal QRS complex, T wave inverted in V1  and  laterally in aVL  Electronically Signed On 2019 1:23:44 PDT by KUMAR LINN MD         LABS  Pertinent Lab Findings  CBC with white count of 11 hemoglobin 12 and a left shift, CMP with a glucose of 135 otherwise normal, normal lipase normal lactate cultures were sent      RADIOLOGY  DX-CHEST-PORTABLE (1 VIEW)   Final Result         1.  Pulmonary edema and/or infiltrates are identified, which are stable since the prior exam.   2.  Cardiomegaly        The radiologist's interpretation of all radiological studies have been reviewed by me.      COURSE & MEDICAL DECISION MAKING  Pertinent Labs & Imaging studies reviewed. (See chart for details)    10:37 PM  Patient resting comfortably had a positive response to IV fluids his chest x-ray is unchanged from prior not requiring further oxygen requirements.  No evidence of pneumonia I came to discuss the case with the patient's sister now she states that she is been talking to her family  "more and it sounds like one family member has been giving the patient stool softeners for the last few days which is why is having diarrhea and also the patient is supposed to be on a soft diet but they have been giving him harder foods which they were giving him tonight at dinner.  There is no evidence of aspiration pneumonia I rechecked his inguinal hernia it again was easily reduced and his abdomen is soft and nontender.  At this time she like to take her brother home and return if she is unable to reduce the hernia as she states that likely symptoms for his difficulty swallowing is because they were giving him the wrong diet and his diarrhea is because of a stool softener.  He does not have any evidence of acute kidney injury or electrolyte abnormality his blood pressure is at baseline for him and he had a pause response to IV fluids he is calm he is cooperative and his repeat abdominal exam is normal.  Patient sister feels comfortable going home and following up with [facility] and returning for any new or worsening issues     BP (!) 95/55   Pulse 79   Temp 36.6 °C (97.8 °F) (Temporal)   Resp (!) 23   Ht 1.575 m (5' 2\")   Wt 56.7 kg (125 lb)   SpO2 96%   BMI 22.86 kg/m²       I verified that the patient was wearing a mask and I was wearing appropriate PPE every time I entered the room. The patient's mask was on the patient at all times during my encounter except for a brief view of the oropharynx.      The patient will return for new or worsening symptoms and is stable at the time of discharge.    The patient is referred to a primary physician for blood pressure management, diabetic screening, and for all other preventative health concerns.    DISPOSITION:  Patient will be discharged home in stable condition.    FOLLOW UP:  Arian Haney M.D.  [address] [address]  [phone]    Schedule an appointment as soon as possible for a visit       [facility] " Emergency Dept  [address] [facility]  [place] [address] [phone]    If symptoms worsen    [facility]  [address]  [facility] [address] [phone]  Call         OUTPATIENT MEDICATIONS:  Discharge Medication List as of 10/8/2021 10:57 PM            FINAL IMPRESSION  1. Right inguinal hernia     2. Difficulty swallowing solids             Electronically signed by: Kristina Muñoz M.D., 10/8/2021 8:01 PM    This dictation has been created using voice recognition software and/or scribes. The accuracy of the dictation is limited by the abilities of the software and the expertise of the scribes. I expect there may be some errors of grammar and possibly content. I made every attempt to manually correct the errors within my dictation. However, errors related to voice recognition software and/or scribes may still exist and should be interpreted within the appropriate context.

## 2021-12-22 ENCOUNTER — HOSPITAL ENCOUNTER (OUTPATIENT)
Dept: RADIOLOGY | Facility: MEDICAL CENTER | Age: 51
End: 2021-12-22
Attending: SURGERY | Admitting: SURGERY
Payer: MEDICARE

## 2021-12-22 ENCOUNTER — PRE-ADMISSION TESTING (OUTPATIENT)
Dept: ADMISSIONS | Facility: MEDICAL CENTER | Age: 51
End: 2021-12-22
Attending: SURGERY
Payer: MEDICARE

## 2021-12-22 DIAGNOSIS — Z01.812 PRE-OPERATIVE LABORATORY EXAMINATION: ICD-10-CM

## 2021-12-22 LAB — EKG IMPRESSION: NORMAL

## 2021-12-22 PROCEDURE — 93005 ELECTROCARDIOGRAM TRACING: CPT

## 2021-12-22 PROCEDURE — 93010 ELECTROCARDIOGRAM REPORT: CPT | Performed by: INTERNAL MEDICINE

## 2021-12-22 PROCEDURE — 72040 X-RAY EXAM NECK SPINE 2-3 VW: CPT

## 2021-12-22 NOTE — OR NURSING
Preadmit Note:     Pt accompanied by father Teodoro Lezama, [phone]  And brother Avelino Mtz to PAT appt. Teodoro and Avelino educated on medications for pt to hold day of procedure and instructed to stop vitamins and supplements per Anesthesia Protocol. Father acknowleged understanding of NPO status and pre procedure instructions.    Pt appears unable to consent for himself. Krista SWENSON (PAT Manager) aware. Dr. Chuck Ugalde deems this procedure urgent and is allowing Teodoro Lezama (NOK) to sign for patient. This RN strongly encouraged family to obtain Legal POA paperwork.

## 2021-12-31 ENCOUNTER — ANESTHESIA EVENT (OUTPATIENT)
Dept: SURGERY | Facility: MEDICAL CENTER | Age: 51
End: 2021-12-31
Payer: MEDICARE

## 2021-12-31 ENCOUNTER — ANESTHESIA (OUTPATIENT)
Dept: SURGERY | Facility: MEDICAL CENTER | Age: 51
End: 2021-12-31
Payer: MEDICARE

## 2021-12-31 ENCOUNTER — HOSPITAL ENCOUNTER (OUTPATIENT)
Facility: MEDICAL CENTER | Age: 51
End: 2021-12-31
Attending: SURGERY | Admitting: SURGERY
Payer: MEDICARE

## 2021-12-31 VITALS
OXYGEN SATURATION: 97 % | SYSTOLIC BLOOD PRESSURE: 105 MMHG | DIASTOLIC BLOOD PRESSURE: 60 MMHG | RESPIRATION RATE: 16 BRPM | BODY MASS INDEX: 19.95 KG/M2 | TEMPERATURE: 97.6 F | HEART RATE: 96 BPM | WEIGHT: 101.63 LBS | HEIGHT: 60 IN

## 2021-12-31 DIAGNOSIS — G89.18 POST-OP PAIN: ICD-10-CM

## 2021-12-31 PROBLEM — I27.20 PULMONARY HTN (HCC): Status: ACTIVE | Noted: 2021-12-31

## 2021-12-31 PROBLEM — K40.90 RIGHT INGUINAL HERNIA: Chronic | Status: ACTIVE | Noted: 2021-12-31

## 2021-12-31 PROBLEM — Q21.10 ASD (ATRIAL SEPTAL DEFECT): Status: ACTIVE | Noted: 2021-12-31

## 2021-12-31 PROBLEM — Z99.81 OXYGEN DEPENDENT: Status: ACTIVE | Noted: 2021-12-31

## 2021-12-31 LAB
EXTERNAL QUALITY CONTROL: NORMAL
SARS-COV+SARS-COV-2 AG RESP QL IA.RAPID: NEGATIVE

## 2021-12-31 PROCEDURE — 501838 HCHG SUTURE GENERAL: Performed by: SURGERY

## 2021-12-31 PROCEDURE — 160025 RECOVERY II MINUTES (STATS): Performed by: SURGERY

## 2021-12-31 PROCEDURE — 160036 HCHG PACU - EA ADDL 30 MINS PHASE I: Performed by: SURGERY

## 2021-12-31 PROCEDURE — 160002 HCHG RECOVERY MINUTES (STAT): Performed by: SURGERY

## 2021-12-31 PROCEDURE — 160029 HCHG SURGERY MINUTES - 1ST 30 MINS LEVEL 4: Performed by: SURGERY

## 2021-12-31 PROCEDURE — C1781 MESH (IMPLANTABLE): HCPCS | Performed by: SURGERY

## 2021-12-31 PROCEDURE — 160035 HCHG PACU - 1ST 60 MINS PHASE I: Performed by: SURGERY

## 2021-12-31 PROCEDURE — 700105 HCHG RX REV CODE 258: Performed by: SURGERY

## 2021-12-31 PROCEDURE — 501570 HCHG TROCAR, SEPARATOR: Performed by: SURGERY

## 2021-12-31 PROCEDURE — 160048 HCHG OR STATISTICAL LEVEL 1-5: Performed by: SURGERY

## 2021-12-31 PROCEDURE — 700101 HCHG RX REV CODE 250: Performed by: SURGERY

## 2021-12-31 PROCEDURE — 160009 HCHG ANES TIME/MIN: Performed by: SURGERY

## 2021-12-31 PROCEDURE — 160046 HCHG PACU - 1ST 60 MINS PHASE II: Performed by: SURGERY

## 2021-12-31 PROCEDURE — 502714 HCHG ROBOTIC SURGERY SERVICES: Performed by: SURGERY

## 2021-12-31 PROCEDURE — A9270 NON-COVERED ITEM OR SERVICE: HCPCS | Performed by: ANESTHESIOLOGY

## 2021-12-31 PROCEDURE — 700111 HCHG RX REV CODE 636 W/ 250 OVERRIDE (IP): Performed by: ANESTHESIOLOGY

## 2021-12-31 PROCEDURE — 700101 HCHG RX REV CODE 250: Performed by: ANESTHESIOLOGY

## 2021-12-31 PROCEDURE — 700105 HCHG RX REV CODE 258: Performed by: ANESTHESIOLOGY

## 2021-12-31 PROCEDURE — 700102 HCHG RX REV CODE 250 W/ 637 OVERRIDE(OP): Performed by: ANESTHESIOLOGY

## 2021-12-31 PROCEDURE — 160041 HCHG SURGERY MINUTES - EA ADDL 1 MIN LEVEL 4: Performed by: SURGERY

## 2021-12-31 PROCEDURE — 87426 SARSCOV CORONAVIRUS AG IA: CPT | Performed by: SURGERY

## 2021-12-31 DEVICE — MESH PROGRIP LAPROSCOPIC SELF FIXATING (1/CA): Type: IMPLANTABLE DEVICE | Site: GROIN | Status: FUNCTIONAL

## 2021-12-31 RX ORDER — METOPROLOL TARTRATE 1 MG/ML
1 INJECTION, SOLUTION INTRAVENOUS
Status: DISCONTINUED | OUTPATIENT
Start: 2021-12-31 | End: 2021-12-31 | Stop reason: HOSPADM

## 2021-12-31 RX ORDER — HYDROMORPHONE HYDROCHLORIDE 1 MG/ML
0.2 INJECTION, SOLUTION INTRAMUSCULAR; INTRAVENOUS; SUBCUTANEOUS
Status: DISCONTINUED | OUTPATIENT
Start: 2021-12-31 | End: 2021-12-31 | Stop reason: HOSPADM

## 2021-12-31 RX ORDER — DEXAMETHASONE SODIUM PHOSPHATE 4 MG/ML
INJECTION, SOLUTION INTRA-ARTICULAR; INTRALESIONAL; INTRAMUSCULAR; INTRAVENOUS; SOFT TISSUE PRN
Status: DISCONTINUED | OUTPATIENT
Start: 2021-12-31 | End: 2021-12-31 | Stop reason: SURG

## 2021-12-31 RX ORDER — MIDAZOLAM HYDROCHLORIDE 1 MG/ML
1 INJECTION INTRAMUSCULAR; INTRAVENOUS
Status: DISCONTINUED | OUTPATIENT
Start: 2021-12-31 | End: 2021-12-31 | Stop reason: HOSPADM

## 2021-12-31 RX ORDER — SODIUM CHLORIDE, SODIUM LACTATE, POTASSIUM CHLORIDE, CALCIUM CHLORIDE 600; 310; 30; 20 MG/100ML; MG/100ML; MG/100ML; MG/100ML
INJECTION, SOLUTION INTRAVENOUS CONTINUOUS
Status: ACTIVE | OUTPATIENT
Start: 2021-12-31 | End: 2021-12-31

## 2021-12-31 RX ORDER — ROCURONIUM BROMIDE 10 MG/ML
INJECTION, SOLUTION INTRAVENOUS PRN
Status: DISCONTINUED | OUTPATIENT
Start: 2021-12-31 | End: 2021-12-31 | Stop reason: SURG

## 2021-12-31 RX ORDER — HYDRALAZINE HYDROCHLORIDE 20 MG/ML
5 INJECTION INTRAMUSCULAR; INTRAVENOUS
Status: DISCONTINUED | OUTPATIENT
Start: 2021-12-31 | End: 2021-12-31 | Stop reason: HOSPADM

## 2021-12-31 RX ORDER — ONDANSETRON 2 MG/ML
INJECTION INTRAMUSCULAR; INTRAVENOUS PRN
Status: DISCONTINUED | OUTPATIENT
Start: 2021-12-31 | End: 2021-12-31 | Stop reason: SURG

## 2021-12-31 RX ORDER — LABETALOL HYDROCHLORIDE 5 MG/ML
5 INJECTION, SOLUTION INTRAVENOUS
Status: DISCONTINUED | OUTPATIENT
Start: 2021-12-31 | End: 2021-12-31 | Stop reason: HOSPADM

## 2021-12-31 RX ORDER — ONDANSETRON 2 MG/ML
4 INJECTION INTRAMUSCULAR; INTRAVENOUS
Status: DISCONTINUED | OUTPATIENT
Start: 2021-12-31 | End: 2021-12-31 | Stop reason: HOSPADM

## 2021-12-31 RX ORDER — KETOROLAC TROMETHAMINE 30 MG/ML
INJECTION, SOLUTION INTRAMUSCULAR; INTRAVENOUS PRN
Status: DISCONTINUED | OUTPATIENT
Start: 2021-12-31 | End: 2021-12-31 | Stop reason: SURG

## 2021-12-31 RX ORDER — PHENYLEPHRINE HCL IN 0.9% NACL 0.5 MG/5ML
SYRINGE (ML) INTRAVENOUS PRN
Status: DISCONTINUED | OUTPATIENT
Start: 2021-12-31 | End: 2021-12-31 | Stop reason: SURG

## 2021-12-31 RX ORDER — GLYCOPYRROLATE 0.2 MG/ML
INJECTION INTRAMUSCULAR; INTRAVENOUS PRN
Status: DISCONTINUED | OUTPATIENT
Start: 2021-12-31 | End: 2021-12-31 | Stop reason: SURG

## 2021-12-31 RX ORDER — NEOSTIGMINE METHYLSULFATE 1 MG/ML
INJECTION, SOLUTION INTRAVENOUS PRN
Status: DISCONTINUED | OUTPATIENT
Start: 2021-12-31 | End: 2021-12-31 | Stop reason: SURG

## 2021-12-31 RX ORDER — OXYCODONE HCL 5 MG/5 ML
5 SOLUTION, ORAL ORAL
Status: COMPLETED | OUTPATIENT
Start: 2021-12-31 | End: 2021-12-31

## 2021-12-31 RX ORDER — MIDAZOLAM HYDROCHLORIDE 1 MG/ML
INJECTION INTRAMUSCULAR; INTRAVENOUS PRN
Status: DISCONTINUED | OUTPATIENT
Start: 2021-12-31 | End: 2021-12-31 | Stop reason: SURG

## 2021-12-31 RX ORDER — BUPIVACAINE HYDROCHLORIDE 5 MG/ML
INJECTION, SOLUTION EPIDURAL; INTRACAUDAL
Status: DISCONTINUED | OUTPATIENT
Start: 2021-12-31 | End: 2021-12-31 | Stop reason: HOSPADM

## 2021-12-31 RX ORDER — HYDROMORPHONE HYDROCHLORIDE 1 MG/ML
0.1 INJECTION, SOLUTION INTRAMUSCULAR; INTRAVENOUS; SUBCUTANEOUS
Status: DISCONTINUED | OUTPATIENT
Start: 2021-12-31 | End: 2021-12-31 | Stop reason: HOSPADM

## 2021-12-31 RX ORDER — MEPERIDINE HYDROCHLORIDE 25 MG/ML
12.5 INJECTION INTRAMUSCULAR; INTRAVENOUS; SUBCUTANEOUS
Status: DISCONTINUED | OUTPATIENT
Start: 2021-12-31 | End: 2021-12-31 | Stop reason: HOSPADM

## 2021-12-31 RX ORDER — OXYCODONE HCL 5 MG/5 ML
10 SOLUTION, ORAL ORAL
Status: COMPLETED | OUTPATIENT
Start: 2021-12-31 | End: 2021-12-31

## 2021-12-31 RX ORDER — HALOPERIDOL 5 MG/ML
1 INJECTION INTRAMUSCULAR
Status: DISCONTINUED | OUTPATIENT
Start: 2021-12-31 | End: 2021-12-31 | Stop reason: HOSPADM

## 2021-12-31 RX ORDER — HYDROMORPHONE HYDROCHLORIDE 1 MG/ML
0.4 INJECTION, SOLUTION INTRAMUSCULAR; INTRAVENOUS; SUBCUTANEOUS
Status: DISCONTINUED | OUTPATIENT
Start: 2021-12-31 | End: 2021-12-31 | Stop reason: HOSPADM

## 2021-12-31 RX ORDER — CEFAZOLIN SODIUM 1 G/3ML
INJECTION, POWDER, FOR SOLUTION INTRAMUSCULAR; INTRAVENOUS PRN
Status: DISCONTINUED | OUTPATIENT
Start: 2021-12-31 | End: 2021-12-31 | Stop reason: SURG

## 2021-12-31 RX ORDER — LIDOCAINE HYDROCHLORIDE 20 MG/ML
INJECTION, SOLUTION EPIDURAL; INFILTRATION; INTRACAUDAL; PERINEURAL PRN
Status: DISCONTINUED | OUTPATIENT
Start: 2021-12-31 | End: 2021-12-31 | Stop reason: SURG

## 2021-12-31 RX ORDER — IPRATROPIUM BROMIDE AND ALBUTEROL SULFATE 2.5; .5 MG/3ML; MG/3ML
3 SOLUTION RESPIRATORY (INHALATION)
Status: DISCONTINUED | OUTPATIENT
Start: 2021-12-31 | End: 2021-12-31 | Stop reason: HOSPADM

## 2021-12-31 RX ORDER — SODIUM CHLORIDE, SODIUM LACTATE, POTASSIUM CHLORIDE, CALCIUM CHLORIDE 600; 310; 30; 20 MG/100ML; MG/100ML; MG/100ML; MG/100ML
INJECTION, SOLUTION INTRAVENOUS CONTINUOUS
Status: DISCONTINUED | OUTPATIENT
Start: 2021-12-31 | End: 2021-12-31 | Stop reason: HOSPADM

## 2021-12-31 RX ORDER — DIPHENHYDRAMINE HYDROCHLORIDE 50 MG/ML
12.5 INJECTION INTRAMUSCULAR; INTRAVENOUS
Status: DISCONTINUED | OUTPATIENT
Start: 2021-12-31 | End: 2021-12-31 | Stop reason: HOSPADM

## 2021-12-31 RX ADMIN — DEXAMETHASONE SODIUM PHOSPHATE 8 MG: 4 INJECTION, SOLUTION INTRA-ARTICULAR; INTRALESIONAL; INTRAMUSCULAR; INTRAVENOUS; SOFT TISSUE at 12:53

## 2021-12-31 RX ADMIN — LIDOCAINE HYDROCHLORIDE 50 MG: 20 INJECTION, SOLUTION EPIDURAL; INFILTRATION; INTRACAUDAL at 11:55

## 2021-12-31 RX ADMIN — EPHEDRINE SULFATE 10 MG: 50 INJECTION, SOLUTION INTRAVENOUS at 12:48

## 2021-12-31 RX ADMIN — EPHEDRINE SULFATE 10 MG: 50 INJECTION, SOLUTION INTRAVENOUS at 12:47

## 2021-12-31 RX ADMIN — ONDANSETRON 4 MG: 2 INJECTION INTRAMUSCULAR; INTRAVENOUS at 12:53

## 2021-12-31 RX ADMIN — NEOSTIGMINE METHYLSULFATE 3 MG: 1 INJECTION INTRAVENOUS at 12:56

## 2021-12-31 RX ADMIN — SODIUM CHLORIDE, POTASSIUM CHLORIDE, SODIUM LACTATE AND CALCIUM CHLORIDE: 600; 310; 30; 20 INJECTION, SOLUTION INTRAVENOUS at 14:01

## 2021-12-31 RX ADMIN — ROCURONIUM BROMIDE 30 MG: 10 INJECTION, SOLUTION INTRAVENOUS at 11:55

## 2021-12-31 RX ADMIN — SODIUM CHLORIDE, POTASSIUM CHLORIDE, SODIUM LACTATE AND CALCIUM CHLORIDE: 600; 310; 30; 20 INJECTION, SOLUTION INTRAVENOUS at 11:52

## 2021-12-31 RX ADMIN — OXYCODONE HYDROCHLORIDE 5 MG: 5 SOLUTION ORAL at 13:27

## 2021-12-31 RX ADMIN — PROPOFOL 50 MG: 10 INJECTION, EMULSION INTRAVENOUS at 11:55

## 2021-12-31 RX ADMIN — Medication 50 MCG: at 12:51

## 2021-12-31 RX ADMIN — CEFAZOLIN 2 G: 330 INJECTION, POWDER, FOR SOLUTION INTRAMUSCULAR; INTRAVENOUS at 11:57

## 2021-12-31 RX ADMIN — KETOROLAC TROMETHAMINE 30 MG: 30 INJECTION, SOLUTION INTRAMUSCULAR at 12:53

## 2021-12-31 RX ADMIN — GLYCOPYRROLATE 0.4 MG: 0.2 INJECTION INTRAMUSCULAR; INTRAVENOUS at 12:56

## 2021-12-31 RX ADMIN — FENTANYL CITRATE 50 MCG: 50 INJECTION, SOLUTION INTRAMUSCULAR; INTRAVENOUS at 12:36

## 2021-12-31 RX ADMIN — MIDAZOLAM HYDROCHLORIDE 1 MG: 1 INJECTION, SOLUTION INTRAMUSCULAR; INTRAVENOUS at 11:48

## 2021-12-31 ASSESSMENT — PAIN SCALES - WONG BAKER: WONGBAKER_NUMERICALRESPONSE: HURTS A LITTLE MORE

## 2021-12-31 ASSESSMENT — PAIN DESCRIPTION - PAIN TYPE
TYPE: ACUTE PAIN

## 2021-12-31 ASSESSMENT — FIBROSIS 4 INDEX: FIB4 SCORE: .845

## 2021-12-31 NOTE — ANESTHESIA TIME REPORT
Anesthesia Start and Stop Event Times     Date Time Event    12/31/2021 1124 Ready for Procedure     1152 Anesthesia Start     1307 Anesthesia Stop        Responsible Staff  12/31/21    Name Role Begin End    Daniel Guerra M.D. Anesth 1152 1307        Preop Diagnosis (Free Text):  Pre-op Diagnosis     RIGHT INGUINAL HERNIA        Preop Diagnosis (Codes):    Premium Reason  F. Holiday    Comments:

## 2021-12-31 NOTE — DISCHARGE INSTRUCTIONS
ACTIVITY: Rest and take it easy for the first 24 hours.  A responsible adult is recommended to remain with you during that time.  It is normal to feel sleepy.  We encourage you to not do anything that requires balance, judgment or coordination.    MILD FLU-LIKE SYMPTOMS ARE NORMAL. YOU MAY EXPERIENCE GENERALIZED MUSCLE ACHES, THROAT IRRITATION, HEADACHE AND/OR SOME NAUSEA.    FOR 24 HOURS DO NOT:  Drive, operate machinery or run household appliances.  Drink beer or alcoholic beverages.   Make important decisions or sign legal documents.    SPECIAL INSTRUCTIONS: no lifting greater than 10 pounds for 4-6 weeks    DIET: To avoid nausea, slowly advance diet as tolerated, avoiding spicy or greasy foods for the first day.  Add more substantial food to your diet according to your physician's instructions.  Babies can be fed formula or breast milk as soon as they are hungry.  INCREASE FLUIDS AND FIBER TO AVOID CONSTIPATION.    SURGICAL DRESSING/BATHING: may shower, no soaking    FOLLOW-UP APPOINTMENT:  A follow-up appointment should be arranged with MD Ugalde in 1 week; call to [email or web address], [phone]    You should CALL YOUR PHYSICIAN if you develop:  Fever greater than 101 degrees F.  Pain not relieved by medication, or persistent nausea or vomiting.  Excessive bleeding (blood soaking through dressing) or unexpected drainage from the wound.  Extreme redness or swelling around the incision site, drainage of pus or foul smelling drainage.  Inability to urinate or empty your bladder within 8 hours.  Problems with breathing or chest pain.    You should call [phone] if you develop problems with breathing or chest pain.  If you are unable to contact your doctor or surgical center, you should go to the nearest emergency room or urgent care center.  Physician's telephone #: [phone]    If any questions arise, call your doctor.  If your doctor is not available, please feel free to call the Surgical Center at [phone].     A  registered nurse may call you a few days after your surgery to see how you are doing after your procedure.    MEDICATIONS: Resume taking daily medication.  Take prescribed pain medication with food.  If no medication is prescribed, you may take non-aspirin pain medication if needed.  PAIN MEDICATION CAN BE VERY CONSTIPATING.  Take a stool softener or laxative such as senokot, pericolace, or milk of magnesia if needed.    Prescription given for hycet liquid.  Last pain medication given at 1:27pm    If your physician has prescribed pain medication that includes Acetaminophen (Tylenol), do not take additional Acetaminophen (Tylenol) while taking the prescribed medication.    Depression / Suicide Risk    As you are discharged from this [facility] facility, it is important to learn how to keep safe from harming yourself.    Recognize the warning signs:  · Abrupt changes in personality, positive or negative- including increase in energy   · Giving away possessions  · Change in eating patterns- significant weight changes-  positive or negative  · Change in sleeping patterns- unable to sleep or sleeping all the time   · Unwillingness or inability to communicate  · Depression  · Unusual sadness, discouragement and loneliness  · Talk of wanting to die  · Neglect of personal appearance   · Rebelliousness- reckless behavior  · Withdrawal from people/activities they love  · Confusion- inability to concentrate     If you or a loved one observes any of these behaviors or has concerns about self-harm, here's what you can do:  · Talk about it- your feelings and reasons for harming yourself  · Remove any means that you might use to hurt yourself (examples: pills, rope, extension cords, firearm)  · Get professional help from the community (Mental Health, Substance Abuse, psychological counseling)  · Do not be alone:Call your Safe Contact- someone whom you trust who will be there for you.  · Call your local CRISIS HOTLINE 899-8502  or 849-477-6685  · Call your local Children's Mobile Crisis Response Team [place] (321) 348-7349 or www.[employer].Frevvo  · Call the toll free National Suicide Prevention Hotlines   · National Suicide Prevention Lifeline 767-835-XKBD (1701)  · National [employer] Line Network 800-SUICIDE (625-1922)

## 2021-12-31 NOTE — PROGRESS NOTES
Pharmacy Medication Reconciliation      ~Medication reconciliation updated and complete per patient family at bedside  ~Allergies have been verified   ~No oral ABX within the last 30 days  ~Patient home pharmacy:[facility]

## 2021-12-31 NOTE — OR NURSING
Pre-op orders and assessment complete. Vital signs stable. Uses 5L of O2 baseline.  Patient and family updated on plan of care. Call light within reach.  No needs at this time.

## 2021-12-31 NOTE — OR NURSING
Pt resting quietly. VSS on 2 L O2 via NC.   Pt's father brought into PACU to sit with pt.  Per pt's father, pt wears between 2-5 L O2 via NC at home.     Once pt is more awake will transfer to phase II.

## 2021-12-31 NOTE — H&P
Surgery General History & Physical Note    Date  12/31/2021    Primary Care Physician  Arian Haney M.D.    CC  Here for elective hernia surgery    HPI  This is a 51 y.o. male who presented with recurrent inguinal hernia.  Since his clinic visit he was seen by his cardiologist who deemed him to be intermediate risk without need for additional testing.    Past Medical History:   Diagnosis Date   • Down's syndrome     Non-Verbal   • Heart murmur    • High cholesterol    • Hypothyroidism        Past Surgical History:   Procedure Laterality Date   • HERNIA REPAIR         Current Facility-Administered Medications   Medication Dose Route Frequency Provider Last Rate Last Admin   • lidocaine (XYLOCAINE) 1 % injection 0.5 mL  0.5 mL Intradermal Once PRN Chuck Ugalde M.D.       • lactated ringers infusion   Intravenous Continuous Chuck Ugalde M.D.           Social History     Socioeconomic History   • Marital status: Single     Spouse name: Not on file   • Number of children: Not on file   • Years of education: Not on file   • Highest education level: Not on file   Occupational History   • Not on file   Tobacco Use   • Smoking status: Never Smoker   • Smokeless tobacco: Never Used   Vaping Use   • Vaping Use: Never used   Substance and Sexual Activity   • Alcohol use: Never   • Drug use: Never   • Sexual activity: Not on file   Other Topics Concern   • Not on file   Social History Narrative   • Not on file     Social Determinants of Health     Financial Resource Strain:    • Difficulty of Paying Living Expenses: Not on file   Food Insecurity:    • Worried About Running Out of Food in the Last Year: Not on file   • Ran Out of Food in the Last Year: Not on file   Transportation Needs:    • Lack of Transportation (Medical): Not on file   • Lack of Transportation (Non-Medical): Not on file   Physical Activity:    • Days of Exercise per Week: Not on file   • Minutes of Exercise per Session: Not on file   Stress:     • Feeling of Stress : Not on file   Social Connections:    • Frequency of Communication with Friends and Family: Not on file   • Frequency of Social Gatherings with Friends and Family: Not on file   • Attends [employer] Services: Not on file   • Active Member of Clubs or Organizations: Not on file   • Attends Club or Organization Meetings: Not on file   • Marital Status: Not on file   Intimate Partner Violence:    • Fear of Current or Ex-Partner: Not on file   • Emotionally Abused: Not on file   • Physically Abused: Not on file   • Sexually Abused: Not on file   Housing Stability:    • Unable to Pay for Housing in the Last Year: Not on file   • Number of Places Lived in the Last Year: Not on file   • Unstable Housing in the Last Year: Not on file       Family History   Problem Relation Age of Onset   • Lung Disease Mother        Allergies  Patient has no known allergies.    Review of Systems  Negative    Physical Exam  Vitals reviewed.   HENT:      Head: Normocephalic.      Nose: Nose normal.      Mouth/Throat:      Mouth: Mucous membranes are moist.   Eyes:      Extraocular Movements: Extraocular movements intact.   Cardiovascular:      Rate and Rhythm: Normal rate.      Pulses: Normal pulses.   Pulmonary:      Effort: Pulmonary effort is normal.   Abdominal:      General: Abdomen is flat.   Musculoskeletal:      Cervical back: Normal range of motion.   Skin:     General: Skin is warm and dry.   Neurological:      Mental Status: Mental status is at baseline.         Vital Signs  Blood Pressure: 109/62   Temperature: 36.6 °C (97.9 °F)   Pulse: 74   Respiration: 16   Pulse Oximetry: 100 %       Labs:                    Radiology:  No orders to display         Assessment/Plan:  51-year-old male with Down syndrome ASD and pulmonary hypertension with a right inguinal hernia  Procedure(s):  REPAIR, HERNIA, INGUINAL, ROBOT-ASSISTED, USING DA ALEK XI   Discussed with the patient's father options for repair.  Local  anesthetic may not be the best option given the patient's inability to communicate.  I would like to proceed under general anesthesia and cardiology deemed the patient intermediate risk.  We discussed aborting the procedure for safety reasons if the patient becomes unstable as well as the possibility for post procedure hospitalization.  All of the patient's questions were answered and he wishes to proceed with surgery for his son.  Chuck Ugalde MD PhD  [facility]  Colon and Rectal Surgeon  [phone]

## 2021-12-31 NOTE — OR NURSING
Pt's VSS; denies N/V; states pain is at tolerable level. Dressing CDI to abdomen. D/c orders received. IV dc'd. Pt changed into clothing with assistance. Discharge instructions given as well as pain management handout; pt and family verbalized understanding and questions answered. Patient states ready to d/c home. No prescriptions given. Pt dc'd in w/c with father in stable condition.

## 2021-12-31 NOTE — ANESTHESIA PROCEDURE NOTES
Airway    Date/Time: 12/31/2021 11:56 AM  Performed by: Daniel Guerra M.D.  Authorized by: Daniel Guerra M.D.     Location:  OR  Urgency:  Elective  Difficult Airway: No    Indications for Airway Management:  Anesthesia      Spontaneous Ventilation: absent    Sedation Level:  Deep  Preoxygenated: Yes    Patient Position:  Sniffing  Mask Difficulty Assessment:  1 - vent by mask  Final Airway Type:  Endotracheal airway  Final Endotracheal Airway:  ETT  Cuffed: Yes    Technique Used for Successful ETT Placement:  Direct laryngoscopy    Insertion Site:  Oral  Blade Type:  Esparza  Laryngoscope Blade/Videolaryngoscope Blade Size:  2  ETT Size (mm):  7.0  Measured from:  Gums  ETT to Gums (cm):  21  Placement Verified by: auscultation and capnometry    Cormack-Lehane Classification:  Grade I - full view of glottis  Number of Attempts at Approach:  1

## 2021-12-31 NOTE — ANESTHESIA PREPROCEDURE EVALUATION
Case: [id number] Date/Time: 12/31/21 1015    Procedure: REPAIR, HERNIA, INGUINAL, ROBOT-ASSISTED, USING DA ALEK XI (Right )    Pre-op diagnosis: RIGHT INGUINAL HERNIA    Location: [facility] OR 08 / SURGERY [facility]    Surgeons: Chuck Ugalde M.D.          Relevant Problems   PULMONARY   (positive) Aspiration pneumonia (HCC)      CARDIAC   (positive) Pulmonary HTN (HCC)      Other   (positive) ASD (atrial septal defect)   (positive) Acute on chronic respiratory failure (HCC)   (positive) Down's syndrome   (positive) Oxygen dependent   (positive) Severe protein-calorie malnutrition (HCC)       Physical Exam    Airway   Mallampati: II  TM distance: >3 FB  Neck ROM: full       Cardiovascular - normal exam  Rhythm: regular  Rate: normal  (-) murmur     Dental - normal exam           Pulmonary - normal exam  Breath sounds clear to auscultation     Abdominal    Neurological - normal exam                 Anesthesia Plan    ASA 3   ASA physical status 3 criteria: respiratory insufficiency or compromise    Plan - general       Airway plan will be ETT          Induction: intravenous    Postoperative Plan: Postoperative administration of opioids is intended.    Pertinent diagnostic labs and testing reviewed    Informed Consent:    Anesthetic plan and risks discussed with patient.    Use of blood products discussed with: patient whom consented to blood products.

## 2021-12-31 NOTE — OR NURSING
Pt more awake and when asked if his would like to get ready to go home he smiles and says yes.   Pt calm and says he is feeling ok.     Surgical incisions CDI.     Report called to KATTY Duarte and pt transferred via rney to phase II. Upon arrival pt transferred to chair with 2 person assist.   Father present.

## 2021-12-31 NOTE — ANESTHESIA POSTPROCEDURE EVALUATION
Patient: Arian Ramsey    Procedure Summary     Date: 12/31/21 Room / Location: [facility] 08 / SURGERY [facility]    Anesthesia Start: 1152 Anesthesia Stop: 1307    Procedure: REPAIR, HERNIA, INGUINAL, ROBOT-ASSISTED, USING DA ALEK XI (Right Groin) Diagnosis: (RIGHT INGUINAL HERNIA)    Surgeons: Chuck Ugalde M.D. Responsible Provider: Daniel Guerra M.D.    Anesthesia Type: general ASA Status: 3          Final Anesthesia Type: general  Last vitals  BP   Blood Pressure: 113/60    Temp   36.5 °C (97.7 °F)    Pulse   95   Resp   17    SpO2   97 %      Anesthesia Post Evaluation    Patient location during evaluation: PACU  Patient participation: complete - patient participated  Level of consciousness: awake and alert    Airway patency: patent  Anesthetic complications: no  Cardiovascular status: hemodynamically stable  Respiratory status: acceptable  Hydration status: euvolemic    PONV: none          No complications documented.     Nurse Pain Score: 0 (NPRS)

## 2021-12-31 NOTE — OR NURSING
Pt arouses to voice and then falls back to sleep. VSS on 2 L O2 via NC.   Pt grimacing intermittently and when asked about pain, pt says yes. 5 mg oxycodone administered per orders. Pt tolerated well.     Surgical incisions CDI. Ice pack in place.     Pt's father called and update provided.

## 2022-01-04 NOTE — OP REPORT
DATE OF OPERATION: 12/31/2021    SURGEON:  Chuck Ugalde MD    Assistant: Barbi BRAVO  The indications for a surgical assistant in this surgery were indicated due to complexity of the procedure. Their role included aiding in incision, retraction, holding devices including camera for laparoscopic procedure, and closure of the wound.    PREOPERATIVE DIAGNOSIS:  Right inguinal hernia.  Multiple sclerosis   Down syndrome with Developmental delay  Atrioseptal defect  Pulmonary hypertension      POSTOPERATIVE DIAGNOSIS:  Right inguinal hernia.  Multiple sclerosis   Down syndrome with Developmental delay  Atrioseptal defect  Pulmonary hypertension    SURGERY:  Laparoscopic robotic-assisted inguinal right herniorrhaphy with mesh    OPERATIVE FINDINGS:  Fairly large indirect inguinal hernia on the right with no evidence of recurrence on the left    INDICATIONS FOR PROCEDURE:  This is a 51 y.o. male, who complains of pain and bulge with moderate groin discomfort with occasional scrotal swelling.  On physical examination, the patient was found to have a reducible right inguinal hernia and due to his pain and interference with daily activities, elected to undergo laparoscopic robotic-assisted inguinal herniorrhaphy of the patient's right primary inguinal hernia.    PROCEDURE IN BRIEF:  After informed witnessed consent was obtained, the patient was taken to the operating room, where the patient's right groin and abdomen were clipped.  He was given preoperative antibiotics.  SCDs were placed and he underwent general endotracheal anesthesia without event.  The patient's abdomen and groin were and prepped and draped in usual standard sterile fashion and a WHO-compliant timeout was performed, verifying the correct patient, procedure site, positioning and availability of equipment prior to the start of the procedure.      A 5 mm incision was made about 5 cm above the umbilicus using a scalpel.  Through this incision an  Optiview technique was used with a 5 mm laparoscope to enter the abdominal cavity.  The abdomen was insufflated with carbon dioxide to create a pneumoperitoneum.  Additional ports were then placed.  An 8 mm port was placed in the right midabdomen and another 8 mm port in the left mid abdomen.  The 5 mm port was exchanged for a 8 mm port.  These were all placed under laparoscopic guidance.  Evaluation of the pelvis revealed the patient was noted to have a fairly large right inguinal hernia.  The robot was brought into position and docked to the patient.  An incision was made about 2 cm above the defect in the inguinal region and peritoneum.  This incision was started off the median umbilical ligament and carried out laterally about 6-7 cm.  Next the peritoneal flaps were developed from this opening.  The inferior flap was dissected down off the inferior epigastric vessels, the vas deferens and testicular vessels.  The hernia sac was carefully dissected out of the defect that was present.  The entire peritoneum was removed from this area and away from the pseudo-sac.  Dissection was carried out more medially exposing Cirilo's ligament as well as the iliac vein.  The femoral space and anterior abdominal wall also identified.  Dissection was then carried out in the lateral aspect to the inferior epigastric vessels.  4 by 6 inch piece of Progrip  mesh was placed through the 8 mm port and passed down into the pelvis.  It was deployed to the rough edges against the abdominal wall.  The mesh easily covered the medial aspect of the dissection including the femoral space, the direct space and the indirect space.  With the mesh now with a nice flat lie against the abdominal wall the peritoneum was closed using a #1 Stratus fix suture in a continuous fashion.  The suture was then cut flush to the peritoneum, so that there was no exposed suture.  The needle was then removed from the abdomen.  Evaluation of the repair revealed no  abnormalities.  The abdomen was desufflated and the remaining ports were then removed and the skin was closed with subcuticular running suture of 4-0 Monocryl.  Dressing was applied, the testis was returned into its normal anatomic position in the scrotum.  The patient tolerated the procedure well, was taken to the post anesthesia care unit in stable condition.       ____________________________________     MARSHAL GREY MD

## 2022-03-24 ENCOUNTER — HOSPITAL ENCOUNTER (OUTPATIENT)
Facility: MEDICAL CENTER | Age: 52
End: 2022-03-24
Attending: PHYSICIAN ASSISTANT
Payer: MEDICARE

## 2022-03-24 PROCEDURE — 87186 SC STD MICRODIL/AGAR DIL: CPT

## 2022-03-24 PROCEDURE — 87086 URINE CULTURE/COLONY COUNT: CPT

## 2022-03-24 PROCEDURE — 87077 CULTURE AEROBIC IDENTIFY: CPT

## 2022-03-28 LAB
BACTERIA UR CULT: ABNORMAL
BACTERIA UR CULT: ABNORMAL
SIGNIFICANT IND 70042: ABNORMAL
SITE SITE: ABNORMAL
SOURCE SOURCE: ABNORMAL

## 2022-07-27 ENCOUNTER — HOSPITAL ENCOUNTER (OUTPATIENT)
Facility: MEDICAL CENTER | Age: 52
End: 2022-07-27
Attending: PHYSICIAN ASSISTANT
Payer: MEDICARE

## 2022-07-27 PROCEDURE — 87086 URINE CULTURE/COLONY COUNT: CPT

## 2022-07-28 ENCOUNTER — HOSPITAL ENCOUNTER (OUTPATIENT)
Facility: MEDICAL CENTER | Age: 52
End: 2022-07-28
Attending: PHYSICIAN ASSISTANT
Payer: MEDICARE

## 2022-07-28 PROCEDURE — 84154 ASSAY OF PSA FREE: CPT

## 2022-07-28 PROCEDURE — 84153 ASSAY OF PSA TOTAL: CPT | Mod: GZ

## 2022-07-30 LAB
BACTERIA UR CULT: NORMAL
PSA FREE MFR SERPL: <50 %
PSA FREE SERPL-MCNC: <0.1 NG/ML
PSA SERPL-MCNC: 0.2 NG/ML (ref 0–4)
SIGNIFICANT IND 70042: NORMAL
SITE SITE: NORMAL
SOURCE SOURCE: NORMAL

## 2023-01-17 NOTE — THERAPY
"Speech Language Therapy dysphagia treatment completed.   Functional Status:  Pt seen for dysphagia tx with family at bedside this morning (father, sister). Family reports pt does cough while eating cookies sometimes, choked on potato salad at his birthday party and think he might need pureed foods. Pt was able to follow 1-step directives in context and express himself at the phrase level though intelligibility was reduced. Pt is currently on 6L O2 via oxymask with, which was removed and replaced after each bolus was administered. O2 sats ranged from 91-95% throughout the session. Pt was presented with ice chips (5), NTL via 1/2 tsp, purees via 1/2 tsp, soft solids (1 small peach). Oral smacking, lingual pumping and vertical chew pattern noted during oral phase. Pharyngeal swallow response was delayed ~2-3s. Hyolaryngeal excursion palpated as reduced. Pt swallowed 2-3x per bolus, concerning for potential pharyngeal residue. Throat clear and increased vocal strain noted following intake of NTL x1. Pt able to execute a strong volitional cough with verbal/tactile cues and vocal quality returned to baseline. No increased WOB or wet/gurgy voice appreciated with PO trials today. However, pt would benefit from a Modified Barium Swallow study to objectively assess oropharyngeal swallow function and further direct POC prior to initiating a PO diet.     Recommendations: Continue NPO/TF; MBS to objectively assess oropharyngeal swallow function and further direct POC  Plan of Care: Will benefit from Speech Therapy 5 times per week  Post-Acute Therapy: Recommend inpatient transitional care services for continued speech therapy services.        See \"Rehab Therapy-Acute\" Patient Summary Report for complete documentation.     " Alert and oriented to person, place and time

## 2024-01-05 NOTE — ED TRIAGE NOTES
- continue 10mg hydrocortisone in AM; 5mg in PM  - follows outpatient with endocrine (has not seen since 1/2023; was supposed to have 4 month follow up but has been admitted multiple times since; consider referral to get patient reestablished on discharge)   Chief Complaint   Patient presents with   • Hernia     right inguinal protruding hernia for 3 weeks    • Nausea/Vomiting/Diarrhea     x 2 days    • Other     pt combative with EMS not wanting to come to the ER- sister POA called and insisted. 5mg versed IN given en route

## 2024-10-24 NOTE — PROGRESS NOTES
Pt transferred from ED by this RN. Report given by KATTY Brandon. Patient is alert, showing no signs of distress. Pt is essentially non- verbal, but communicated through his sister who is at bedside. Pt belongings and paperwork taken with. Transported to [facility] via [facility] with [employer]oll monitor and 2nd RN.    Discharged

## (undated) DEVICE — CANISTER SUCTION 3000ML MECHANICAL FILTER AUTO SHUTOFF MEDI-VAC NONSTERILE LF DISP  (40EA/CA)

## (undated) DEVICE — SYSTEM CLEARIFY VISUALIZATION (10EA/PK)

## (undated) DEVICE — TUBE CONNECT SUCTION CLEAR 120 X 1/4" (50EA/CA)"

## (undated) DEVICE — SHEARS MONOPOLAR CURVED  DA VINCI 10X'S REUSABLE

## (undated) DEVICE — GLOVE SIZE 7.0 SURGEON ACCELERATOR FREE GREEN (50PR/BX 4BX/CA)

## (undated) DEVICE — SET TUBING PNEUMOCLEAR HIGH FLOW SMOKE EVACUATION (10EA/BX)

## (undated) DEVICE — SODIUM CHL IRRIGATION 0.9% 1000ML (12EA/CA)

## (undated) DEVICE — OBTURATOR BLADELESS STANDARD 8MM (6EA/BX)

## (undated) DEVICE — SUTURE GENERAL

## (undated) DEVICE — GLOVE BIOGEL SZ 6.5 SURGICAL PF LTX (50PR/BX 4BX/CA)

## (undated) DEVICE — NEPTUNE 4 PORT MANIFOLD - (20/PK)

## (undated) DEVICE — GLOVE BIOGEL PI INDICATOR SZ 6.0 SURGICAL PF LF -(200PR/CA)

## (undated) DEVICE — SLEEVE, VASO, THIGH, MED

## (undated) DEVICE — GLOVE SZ 6 BIOGEL PI MICRO - PF LF (50PR/BX 4BX/CA)

## (undated) DEVICE — SEAL 5MM-8MM UNIVERSAL  BOX OF 10

## (undated) DEVICE — DRAPE STRLE REG TOWEL 18X24 - (10/BX 4BX/CA)"

## (undated) DEVICE — KIT ANESTHESIA W/CIRCUIT & 3/LT BAG W/FILTER (20EA/CA)

## (undated) DEVICE — ELECTRODE 850 FOAM ADHESIVE - HYDROGEL RADIOTRNSPRNT (50/PK)

## (undated) DEVICE — BLADE SURGICAL #15 - (50/BX 3BX/CA)

## (undated) DEVICE — HEAD HOLDER JUNIOR/ADULT

## (undated) DEVICE — ELECTRODE DUAL RETURN W/ CORD - (50/PK)

## (undated) DEVICE — MASK ANESTHESIA ADULT  - (100/CA)

## (undated) DEVICE — PROTECTOR ULNA NERVE - (36PR/CA)

## (undated) DEVICE — SUCTION INSTRUMENT YANKAUER BULBOUS TIP W/O VENT (50EA/CA)

## (undated) DEVICE — SENSOR SPO2 NEO LNCS ADHESIVE (20/BX) SEE USER NOTES

## (undated) DEVICE — ROBOTIC SURGERY SERVICES

## (undated) DEVICE — SET EXTENSION WITH 2 PORTS (48EA/CA) ***PART #2C8610 IS A SUBSTITUTE*****

## (undated) DEVICE — TROCAR 5X100 NON BLADED Z-TH - READ KII (6/BX)

## (undated) DEVICE — TUBING CLEARLINK DUO-VENT - C-FLO (48EA/CA)

## (undated) DEVICE — DRAPE COLUMN  BOX OF 20

## (undated) DEVICE — DRAPE ARM  BOX OF 20

## (undated) DEVICE — COVER TIP ENDOWRIST HOT SHEAR - (10EA/BX) DA VINCI

## (undated) DEVICE — Device

## (undated) DEVICE — CHLORAPREP 26 ML APPLICATOR - ORANGE TINT(25/CA)

## (undated) DEVICE — TOWEL STOP TIMEOUT SAFETY FLAG (40EA/CA)

## (undated) DEVICE — NEEDLE DRIVER MEGA SUTURECUT DA VINCI 15X'S REUSABLE

## (undated) DEVICE — SET LEADWIRE 5 LEAD BEDSIDE DISPOSABLE ECG (1SET OF 5/EA)

## (undated) DEVICE — SUTURE 2-0 20CM STRATAFIX SPIRAL SH NEEDLE (12/BX)

## (undated) DEVICE — LACTATED RINGERS INJ 1000 ML - (14EA/CA 60CA/PF)

## (undated) DEVICE — SUTURE 4-0 MONOCRYL PLUS PS-2 - 27 INCH (36/BX)

## (undated) DEVICE — TUBE E-T HI-LO CUFF 7.0MM (10EA/PK)

## (undated) DEVICE — GOWN WARMING STANDARD FLEX - (30/CA)

## (undated) DEVICE — GLOVE BIOGEL SZ 7 SURGICAL PF LTX - (50PR/BX 4BX/CA)

## (undated) DEVICE — GLOVE BIOGEL SZ 8 SURGICAL PF LTX - (50PR/BX 4BX/CA)

## (undated) DEVICE — GLOVE BIOGEL PI ORTHO SZ 7 PF LF (40PR/BX)

## (undated) DEVICE — GLOVE SZ 7 BIOGEL PI MICRO - PF LF (50PR/BX 4BX/CA)